# Patient Record
Sex: MALE | Race: WHITE | Employment: OTHER | URBAN - METROPOLITAN AREA
[De-identification: names, ages, dates, MRNs, and addresses within clinical notes are randomized per-mention and may not be internally consistent; named-entity substitution may affect disease eponyms.]

---

## 2017-01-10 ENCOUNTER — OFFICE VISIT (OUTPATIENT)
Dept: CARDIOLOGY CLINIC | Age: 82
End: 2017-01-10

## 2017-01-10 VITALS
WEIGHT: 149 LBS | BODY MASS INDEX: 23.95 KG/M2 | SYSTOLIC BLOOD PRESSURE: 110 MMHG | HEART RATE: 60 BPM | DIASTOLIC BLOOD PRESSURE: 60 MMHG | HEIGHT: 66 IN

## 2017-01-10 DIAGNOSIS — I25.10 CORONARY ARTERY DISEASE INVOLVING NATIVE HEART WITHOUT ANGINA PECTORIS, UNSPECIFIED VESSEL OR LESION TYPE: Primary | ICD-10-CM

## 2017-01-10 DIAGNOSIS — I65.23 CAROTID ARTERY STENOSIS WITHOUT CEREBRAL INFARCTION, BILATERAL: ICD-10-CM

## 2017-01-10 PROCEDURE — 99214 OFFICE O/P EST MOD 30 MIN: CPT | Performed by: NURSE PRACTITIONER

## 2017-01-23 RX ORDER — METOPROLOL SUCCINATE 25 MG/1
TABLET, EXTENDED RELEASE ORAL
Qty: 90 TABLET | Refills: 3 | Status: SHIPPED | OUTPATIENT
Start: 2017-01-23 | End: 2017-12-01 | Stop reason: SDUPTHER

## 2017-04-11 ENCOUNTER — TELEPHONE (OUTPATIENT)
Dept: INTERNAL MEDICINE CLINIC | Age: 82
End: 2017-04-11

## 2017-04-26 ENCOUNTER — OFFICE VISIT (OUTPATIENT)
Dept: CARDIOLOGY CLINIC | Age: 82
End: 2017-04-26

## 2017-04-26 VITALS
OXYGEN SATURATION: 94 % | DIASTOLIC BLOOD PRESSURE: 64 MMHG | BODY MASS INDEX: 24.29 KG/M2 | HEART RATE: 60 BPM | HEIGHT: 66 IN | WEIGHT: 151.12 LBS | SYSTOLIC BLOOD PRESSURE: 130 MMHG

## 2017-04-26 DIAGNOSIS — Z95.1 S/P CABG X 4: ICD-10-CM

## 2017-04-26 DIAGNOSIS — E78.49 OTHER HYPERLIPIDEMIA: ICD-10-CM

## 2017-04-26 DIAGNOSIS — I10 ESSENTIAL HYPERTENSION: ICD-10-CM

## 2017-04-26 DIAGNOSIS — I25.10 CORONARY ARTERY DISEASE INVOLVING NATIVE CORONARY ARTERY OF NATIVE HEART WITHOUT ANGINA PECTORIS: ICD-10-CM

## 2017-04-26 DIAGNOSIS — I65.23 CAROTID ARTERY STENOSIS WITHOUT CEREBRAL INFARCTION, BILATERAL: Primary | ICD-10-CM

## 2017-04-26 PROCEDURE — G8427 DOCREV CUR MEDS BY ELIG CLIN: HCPCS | Performed by: INTERNAL MEDICINE

## 2017-04-26 PROCEDURE — G8598 ASA/ANTIPLAT THER USED: HCPCS | Performed by: INTERNAL MEDICINE

## 2017-04-26 PROCEDURE — 4040F PNEUMOC VAC/ADMIN/RCVD: CPT | Performed by: INTERNAL MEDICINE

## 2017-04-26 PROCEDURE — 99213 OFFICE O/P EST LOW 20 MIN: CPT | Performed by: INTERNAL MEDICINE

## 2017-04-26 PROCEDURE — 1036F TOBACCO NON-USER: CPT | Performed by: INTERNAL MEDICINE

## 2017-04-26 PROCEDURE — G8420 CALC BMI NORM PARAMETERS: HCPCS | Performed by: INTERNAL MEDICINE

## 2017-04-26 PROCEDURE — 1123F ACP DISCUSS/DSCN MKR DOCD: CPT | Performed by: INTERNAL MEDICINE

## 2017-04-27 ENCOUNTER — OFFICE VISIT (OUTPATIENT)
Dept: INTERNAL MEDICINE CLINIC | Age: 82
End: 2017-04-27

## 2017-04-27 VITALS
HEIGHT: 66 IN | HEART RATE: 68 BPM | WEIGHT: 150 LBS | BODY MASS INDEX: 24.11 KG/M2 | DIASTOLIC BLOOD PRESSURE: 70 MMHG | SYSTOLIC BLOOD PRESSURE: 150 MMHG

## 2017-04-27 DIAGNOSIS — I10 ESSENTIAL HYPERTENSION: Chronic | ICD-10-CM

## 2017-04-27 DIAGNOSIS — M54.50 ACUTE BILATERAL LOW BACK PAIN WITHOUT SCIATICA: ICD-10-CM

## 2017-04-27 DIAGNOSIS — E78.49 OTHER HYPERLIPIDEMIA: Primary | Chronic | ICD-10-CM

## 2017-04-27 LAB
A/G RATIO: 1.5 (ref 1.1–2.2)
ALBUMIN SERPL-MCNC: 4.2 G/DL (ref 3.4–5)
ALP BLD-CCNC: 65 U/L (ref 40–129)
ALT SERPL-CCNC: 13 U/L (ref 10–40)
ANION GAP SERPL CALCULATED.3IONS-SCNC: 14 MMOL/L (ref 3–16)
AST SERPL-CCNC: 21 U/L (ref 15–37)
BASOPHILS ABSOLUTE: 0.1 K/UL (ref 0–0.2)
BASOPHILS RELATIVE PERCENT: 1.3 %
BILIRUB SERPL-MCNC: 0.3 MG/DL (ref 0–1)
BUN BLDV-MCNC: 40 MG/DL (ref 7–20)
CALCIUM SERPL-MCNC: 10.1 MG/DL (ref 8.3–10.6)
CHLORIDE BLD-SCNC: 102 MMOL/L (ref 99–110)
CHOLESTEROL, TOTAL: 273 MG/DL (ref 0–199)
CO2: 28 MMOL/L (ref 21–32)
CREAT SERPL-MCNC: 1.6 MG/DL (ref 0.8–1.3)
EOSINOPHILS ABSOLUTE: 0.3 K/UL (ref 0–0.6)
EOSINOPHILS RELATIVE PERCENT: 5.1 %
GFR AFRICAN AMERICAN: 49
GFR NON-AFRICAN AMERICAN: 41
GLOBULIN: 2.8 G/DL
GLUCOSE BLD-MCNC: 98 MG/DL (ref 70–99)
HCT VFR BLD CALC: 35.9 % (ref 40.5–52.5)
HEMOGLOBIN: 11.4 G/DL (ref 13.5–17.5)
LYMPHOCYTES ABSOLUTE: 1.2 K/UL (ref 1–5.1)
LYMPHOCYTES RELATIVE PERCENT: 19.2 %
MCH RBC QN AUTO: 26.8 PG (ref 26–34)
MCHC RBC AUTO-ENTMCNC: 31.7 G/DL (ref 31–36)
MCV RBC AUTO: 84.6 FL (ref 80–100)
MONOCYTES ABSOLUTE: 0.6 K/UL (ref 0–1.3)
MONOCYTES RELATIVE PERCENT: 10.4 %
NEUTROPHILS ABSOLUTE: 4 K/UL (ref 1.7–7.7)
NEUTROPHILS RELATIVE PERCENT: 64 %
PDW BLD-RTO: 16.7 % (ref 12.4–15.4)
PLATELET # BLD: 301 K/UL (ref 135–450)
PMV BLD AUTO: 8.3 FL (ref 5–10.5)
POTASSIUM SERPL-SCNC: 4.8 MMOL/L (ref 3.5–5.1)
RBC # BLD: 4.24 M/UL (ref 4.2–5.9)
SODIUM BLD-SCNC: 144 MMOL/L (ref 136–145)
TOTAL PROTEIN: 7 G/DL (ref 6.4–8.2)
WBC # BLD: 6.2 K/UL (ref 4–11)

## 2017-04-27 PROCEDURE — 1036F TOBACCO NON-USER: CPT | Performed by: INTERNAL MEDICINE

## 2017-04-27 PROCEDURE — G8428 CUR MEDS NOT DOCUMENT: HCPCS | Performed by: INTERNAL MEDICINE

## 2017-04-27 PROCEDURE — G8420 CALC BMI NORM PARAMETERS: HCPCS | Performed by: INTERNAL MEDICINE

## 2017-04-27 PROCEDURE — 1123F ACP DISCUSS/DSCN MKR DOCD: CPT | Performed by: INTERNAL MEDICINE

## 2017-04-27 PROCEDURE — 4040F PNEUMOC VAC/ADMIN/RCVD: CPT | Performed by: INTERNAL MEDICINE

## 2017-04-27 PROCEDURE — 99214 OFFICE O/P EST MOD 30 MIN: CPT | Performed by: INTERNAL MEDICINE

## 2017-04-27 PROCEDURE — G8598 ASA/ANTIPLAT THER USED: HCPCS | Performed by: INTERNAL MEDICINE

## 2017-05-12 ENCOUNTER — HOSPITAL ENCOUNTER (OUTPATIENT)
Dept: VASCULAR LAB | Age: 82
Discharge: HOME | End: 2017-05-12
Attending: NURSE PRACTITIONER | Admitting: NURSE PRACTITIONER

## 2017-05-12 DIAGNOSIS — I65.23 CAROTID ARTERY STENOSIS WITHOUT CEREBRAL INFARCTION, BILATERAL: Primary | Chronic | ICD-10-CM

## 2017-05-12 DIAGNOSIS — I65.23 OCCLUSION AND STENOSIS OF BILATERAL CAROTID ARTERIES: ICD-10-CM

## 2017-05-15 ENCOUNTER — TELEPHONE (OUTPATIENT)
Dept: CARDIOLOGY CLINIC | Age: 82
End: 2017-05-15

## 2017-05-15 DIAGNOSIS — I65.23 CAROTID ARTERY STENOSIS WITHOUT CEREBRAL INFARCTION, BILATERAL: Primary | Chronic | ICD-10-CM

## 2017-05-25 RX ORDER — AMLODIPINE BESYLATE 5 MG/1
TABLET ORAL
Qty: 90 TABLET | Refills: 2 | OUTPATIENT
Start: 2017-05-25

## 2017-08-31 ENCOUNTER — OFFICE VISIT (OUTPATIENT)
Dept: INTERNAL MEDICINE CLINIC | Age: 82
End: 2017-08-31

## 2017-08-31 VITALS
SYSTOLIC BLOOD PRESSURE: 124 MMHG | HEART RATE: 72 BPM | HEIGHT: 66 IN | WEIGHT: 154 LBS | DIASTOLIC BLOOD PRESSURE: 62 MMHG | BODY MASS INDEX: 24.75 KG/M2

## 2017-08-31 DIAGNOSIS — K21.9 GASTROESOPHAGEAL REFLUX DISEASE, ESOPHAGITIS PRESENCE NOT SPECIFIED: ICD-10-CM

## 2017-08-31 DIAGNOSIS — I25.118 CORONARY ARTERY DISEASE OF NATIVE ARTERY OF NATIVE HEART WITH STABLE ANGINA PECTORIS (HCC): Primary | ICD-10-CM

## 2017-08-31 PROCEDURE — G8420 CALC BMI NORM PARAMETERS: HCPCS | Performed by: INTERNAL MEDICINE

## 2017-08-31 PROCEDURE — 99214 OFFICE O/P EST MOD 30 MIN: CPT | Performed by: INTERNAL MEDICINE

## 2017-08-31 PROCEDURE — 1123F ACP DISCUSS/DSCN MKR DOCD: CPT | Performed by: INTERNAL MEDICINE

## 2017-08-31 PROCEDURE — 1036F TOBACCO NON-USER: CPT | Performed by: INTERNAL MEDICINE

## 2017-08-31 PROCEDURE — G8598 ASA/ANTIPLAT THER USED: HCPCS | Performed by: INTERNAL MEDICINE

## 2017-08-31 PROCEDURE — G8427 DOCREV CUR MEDS BY ELIG CLIN: HCPCS | Performed by: INTERNAL MEDICINE

## 2017-08-31 PROCEDURE — 4040F PNEUMOC VAC/ADMIN/RCVD: CPT | Performed by: INTERNAL MEDICINE

## 2017-08-31 RX ORDER — NITROGLYCERIN 0.4 MG/1
0.4 TABLET SUBLINGUAL EVERY 5 MIN PRN
Qty: 25 TABLET | Refills: 3 | Status: SHIPPED | OUTPATIENT
Start: 2017-08-31

## 2017-09-01 ENCOUNTER — TELEPHONE (OUTPATIENT)
Dept: CARDIOLOGY CLINIC | Age: 82
End: 2017-09-01

## 2017-09-05 ENCOUNTER — OFFICE VISIT (OUTPATIENT)
Dept: CARDIOLOGY CLINIC | Age: 82
End: 2017-09-05

## 2017-09-05 VITALS
HEIGHT: 66 IN | DIASTOLIC BLOOD PRESSURE: 80 MMHG | HEART RATE: 54 BPM | BODY MASS INDEX: 24.7 KG/M2 | WEIGHT: 153.7 LBS | SYSTOLIC BLOOD PRESSURE: 172 MMHG

## 2017-09-05 DIAGNOSIS — I10 ESSENTIAL HYPERTENSION: Chronic | ICD-10-CM

## 2017-09-05 DIAGNOSIS — R07.9 CHEST PAIN, UNSPECIFIED TYPE: Primary | ICD-10-CM

## 2017-09-05 DIAGNOSIS — I25.118 CORONARY ARTERY DISEASE OF NATIVE ARTERY OF NATIVE HEART WITH STABLE ANGINA PECTORIS (HCC): ICD-10-CM

## 2017-09-05 DIAGNOSIS — Z95.1 S/P CABG X 4: Chronic | ICD-10-CM

## 2017-09-05 PROCEDURE — G8598 ASA/ANTIPLAT THER USED: HCPCS | Performed by: NURSE PRACTITIONER

## 2017-09-05 PROCEDURE — 1036F TOBACCO NON-USER: CPT | Performed by: NURSE PRACTITIONER

## 2017-09-05 PROCEDURE — 93000 ELECTROCARDIOGRAM COMPLETE: CPT | Performed by: NURSE PRACTITIONER

## 2017-09-05 PROCEDURE — G8420 CALC BMI NORM PARAMETERS: HCPCS | Performed by: NURSE PRACTITIONER

## 2017-09-05 PROCEDURE — 4040F PNEUMOC VAC/ADMIN/RCVD: CPT | Performed by: NURSE PRACTITIONER

## 2017-09-05 PROCEDURE — 99214 OFFICE O/P EST MOD 30 MIN: CPT | Performed by: NURSE PRACTITIONER

## 2017-09-05 PROCEDURE — 1123F ACP DISCUSS/DSCN MKR DOCD: CPT | Performed by: NURSE PRACTITIONER

## 2017-09-05 PROCEDURE — G8427 DOCREV CUR MEDS BY ELIG CLIN: HCPCS | Performed by: NURSE PRACTITIONER

## 2017-09-08 ENCOUNTER — HOSPITAL ENCOUNTER (OUTPATIENT)
Dept: GENERAL RADIOLOGY | Age: 82
Discharge: HOME | End: 2017-09-08
Admitting: INTERNAL MEDICINE

## 2017-09-08 DIAGNOSIS — I20.0 UNSTABLE ANGINA (HCC): ICD-10-CM

## 2017-09-18 ENCOUNTER — CARE COORDINATION (OUTPATIENT)
Dept: CASE MANAGEMENT | Age: 82
End: 2017-09-18

## 2017-09-18 ENCOUNTER — TELEPHONE (OUTPATIENT)
Dept: PHARMACY | Facility: CLINIC | Age: 82
End: 2017-09-18

## 2017-09-20 ENCOUNTER — TELEPHONE (OUTPATIENT)
Dept: CARDIOLOGY CLINIC | Age: 82
End: 2017-09-20

## 2017-09-22 ENCOUNTER — TELEPHONE (OUTPATIENT)
Dept: CARDIOLOGY CLINIC | Age: 82
End: 2017-09-22

## 2017-09-29 ENCOUNTER — TELEPHONE (OUTPATIENT)
Dept: PHARMACY | Facility: CLINIC | Age: 82
End: 2017-09-29

## 2017-09-29 ENCOUNTER — CARE COORDINATION (OUTPATIENT)
Dept: CASE MANAGEMENT | Age: 82
End: 2017-09-29

## 2017-10-03 ENCOUNTER — TELEPHONE (OUTPATIENT)
Dept: CARDIOLOGY CLINIC | Age: 82
End: 2017-10-03

## 2017-10-03 ENCOUNTER — OFFICE VISIT (OUTPATIENT)
Dept: CARDIOLOGY CLINIC | Age: 82
End: 2017-10-03

## 2017-10-03 VITALS
HEIGHT: 66 IN | SYSTOLIC BLOOD PRESSURE: 102 MMHG | HEART RATE: 62 BPM | BODY MASS INDEX: 24.43 KG/M2 | WEIGHT: 152 LBS | DIASTOLIC BLOOD PRESSURE: 50 MMHG

## 2017-10-03 DIAGNOSIS — N17.9 ACUTE RENAL FAILURE, UNSPECIFIED ACUTE RENAL FAILURE TYPE (HCC): ICD-10-CM

## 2017-10-03 DIAGNOSIS — I25.10 CORONARY ARTERY DISEASE INVOLVING NATIVE HEART WITHOUT ANGINA PECTORIS, UNSPECIFIED VESSEL OR LESION TYPE: ICD-10-CM

## 2017-10-03 DIAGNOSIS — I10 ESSENTIAL HYPERTENSION: Primary | ICD-10-CM

## 2017-10-03 PROCEDURE — 1123F ACP DISCUSS/DSCN MKR DOCD: CPT | Performed by: NURSE PRACTITIONER

## 2017-10-03 PROCEDURE — 4040F PNEUMOC VAC/ADMIN/RCVD: CPT | Performed by: NURSE PRACTITIONER

## 2017-10-03 PROCEDURE — G8427 DOCREV CUR MEDS BY ELIG CLIN: HCPCS | Performed by: NURSE PRACTITIONER

## 2017-10-03 PROCEDURE — 1036F TOBACCO NON-USER: CPT | Performed by: NURSE PRACTITIONER

## 2017-10-03 PROCEDURE — G8598 ASA/ANTIPLAT THER USED: HCPCS | Performed by: NURSE PRACTITIONER

## 2017-10-03 PROCEDURE — 99214 OFFICE O/P EST MOD 30 MIN: CPT | Performed by: NURSE PRACTITIONER

## 2017-10-03 PROCEDURE — 1111F DSCHRG MED/CURRENT MED MERGE: CPT | Performed by: NURSE PRACTITIONER

## 2017-10-03 PROCEDURE — G8420 CALC BMI NORM PARAMETERS: HCPCS | Performed by: NURSE PRACTITIONER

## 2017-10-03 PROCEDURE — G8484 FLU IMMUNIZE NO ADMIN: HCPCS | Performed by: NURSE PRACTITIONER

## 2017-10-03 RX ORDER — FUROSEMIDE 20 MG/1
20 TABLET ORAL DAILY
Qty: 30 TABLET | Refills: 3 | Status: SHIPPED | OUTPATIENT
Start: 2017-10-03 | End: 2018-04-16 | Stop reason: SDUPTHER

## 2017-10-03 NOTE — LETTER
[address] [address] [address] [address]  Phone: [phone]  Fax: [phone]    Carla Judd NP        October 3, 2017     Gonsalo Bean, [address] [place] [address]    Patient: Kee Holden  MR Number: [id number]  YOB: 1926  Date of Visit: 10/3/2017    Dear Dr. Gonsalo Bean:          Cardiac Follow Up    Referring Provider:  Gonsalo Bean MD        Chief Complaint   Patient presents with    Coronary Artery Disease     S/p pci    Hypertension    Hyperlipidemia       History of Present Illness:  Mr. 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 He was admitted for cath and found to be. He was found that he had a  patent LIMA graft to his left anterior descending. His vein grafts  had degenerated with his right coronary artery vein graft having  serial 50% stenosis and his vein graft to diagonal and obtuse marginal  and sequence having a 95% eccentric stenosis with possible thrombus. At that time, he had creatinine of 1.6. He was given fluids. At that  time, it was decided to place the patient on dual antiplatelet therapy  with Plavix and aspirin and then readmit at a later date for  intervention on the vein grafts. During that week, he had a barium  swallow for food getting stuck. It was found that he had a risk of  aspiration and a mild dysmotility disorderHe has known ischemic cardiomyopathy with an ejection fraction in range of 40-45%. He also had  echocardiography, which showed moderate mitral regurgitation, very  mild aortic stenosis, and mild-to-moderate aortic insufficiency with a  normal left ventricular ejection fraction. He also has mild AI. He had his left CEA on 1/11/16. He was evaluated by nephrology and given hydration.   He was then brought back and had  LVEDP 15-18 appearing restricted in movement. Very  mild aortic stenosis. Mild to moderate aortic and mitral regurgitation. Mild tricuspid regurgitation with RVSP estimated at 28 mmHg. 9/17Left ventricle size is normal.   -Normal left ventricular wall thickness.   -Global left ventricular function is decreased with ejection fraction   estimated from 35 % to 40 %.  -There is hypokinesis of the apical anteroseptal wall. E/e'= 16.2 .   -Mitral annular calcification is present.   -Calcification of the mitral valve noted.   -Moderate mitral regurgitation is present.   -The aortic valve is moderately calcified with upper limits gradients.   -Moderate aortic regurgitation is present.   -There is moderate tricuspid regurgitation with RVSP estimated at 56 mmHg. Problem-- Carotid disease    6/8504: ADALID 41-48% LICA 71-35%  53/3010: ADALID 70-95% LICA 43-92%, unchanged  Left CEA 1/11/16, followed by Dr. Raine Mancera      Problem:HTN- improved    PLAN: d/w DR. Kentrell Clarke  Restart lasix 20 mg a day  Labs three weeks    If you have questions, please do not hesitate to call me. I look forward to following Rhonda Rodriguez along with you.     Sincerely,        Anthony Ortega NP

## 2017-10-03 NOTE — COMMUNICATION BODY
(coronary artery disease); Cancer [facility]); CHF (congestive heart failure) ([facility] [address] GERD (gastroesophageal reflux disease); History of blood transfusion; History of MI (myocardial infarction); Hyperlipidemia; and Hypertension. Surgical History:   has a past surgical history that includes Coronary angioplasty with stent (); joint replacement; Cardiac surgery (); Knee Arthroplasty (Left, 2014); and Carotid endarterectomy (Left, 16). Social History:   reports that he quit smoking about 57 years ago. He has never used smokeless tobacco. He reports that he does not drink alcohol or use illicit drugs. He denies tobacco use misuse of prescription medications or illicit drug use. Family History: Mother--  Father--    Home Medications:     Outpatient Encounter Prescriptions as of 10/3/2017   Medication Sig Dispense Refill    furosemide (LASIX) 20 MG tablet Take 1 tablet by mouth daily 30 tablet 3    isosorbide mononitrate (IMDUR) 30 MG extended release tablet Take 1 tablet by mouth daily 30 tablet 3    clopidogrel (PLAVIX) 75 MG tablet Take 1 tablet by mouth daily 30 tablet 3    nitroGLYCERIN (NITROSTAT) 0.4 MG SL tablet Place 1 tablet under the tongue every 5 minutes as needed for Chest pain up to max of 3 total doses. If no relief after 1 dose, call 911. 25 tablet 3    metoprolol succinate (TOPROL XL) 25 MG extended release tablet TAKE 1 TABLET DAILY IN THE MORNING 90 tablet 3    aspirin 81 MG tablet Take 81 mg by mouth daily.  calcium carbonate (CALCIUM 500) 1250 MG tablet Take 1 tablet by mouth daily.  fish oil-omega-3 fatty acids 1000 MG capsule Take 2 g by mouth daily. No facility-administered encounter medications on file as of 10/3/2017. Allergies:  Statins and Ambien [zolpidem tartrate]     Review of Systems:   · Constitutional: there has been no unanticipated weight loss.  There's been no change in energy level, sleep pattern, or activity level.     · Eyes: No visual changes or diplopia. No scleral icterus. · ENT: No Headaches, hearing loss or vertigo. No mouth sores or sore throat. · Cardiovascular: Reviewed in HPI  · Respiratory: No cough or wheezing, no sputum production. No hematemesis. · Gastrointestinal: No abdominal pain, appetite loss, blood in stools. No change in bowel or bladder habits. · Genitourinary: No dysuria, trouble voiding, or hematuria. · Musculoskeletal:  No gait disturbance, weakness or joint complaints. · Integumentary: No rash or pruritis. · Neurological: No headache, diplopia, change in muscle strength, numbness or tingling. No change in gait, balance, coordination, mood, affect, memory, mentation, behavior. · Psychiatric: No anxiety, no depression. · Endocrine: No malaise, fatigue or temperature intolerance. No excessive thirst, fluid intake, or urination. No tremor. · Hematologic/Lymphatic: No abnormal bruising or bleeding, blood clots or swollen lymph nodes. · Allergic/Immunologic: No nasal congestion or hives. Physical Examination:    Vitals:    10/03/17 1050   BP: (!) 102/50   Pulse: 62        Constitutional and General Appearance: NAD, vigorous and appears younger than stated age   Respiratory:  · Normal excursion and expansion without use of accessory muscles  · Resp Auscultation: Normal breath sounds without dullness  Cardiovascular:  · The apical impulses not displaced  · Heart tones are crisp and normal  · Cervical veins are not engorged  · The carotid upstroke is normal in amplitude and contour without delay, + bruits  · Left neck incision  CEA scar  · Regular rate and rhythm. · No adventitious sounds. · Well-healed median sternotomy incision  · Peripheral pulses are symmetrical and full  · There is no clubbing, cyanosis of the extremities.   · No edema  · Femoral Arteries: 2+ and equal  · Pedal Pulses: 2+ and equal   Abdomen:  · No masses or tenderness  · Liver/Spleen: No Abnormalities Noted  Neurological/Psychiatric:  · Alert and oriented in all spheres  · Moves all extremities well  · Exhibits normal gait balance and coordination, cath site stable  · No abnormalities of mood, affect, memory, mentation, or behavior are noted      Assessment/Plan:    Problem- coronary disease:  Hx of CABG in 1999 and prior stent to LCx.  5/2009: SVG to right PDA patent, SVG to diagonal 1 and ramus patent, SVG to diagonal 1 and ramus patent--LIMA to LAD (small vessel) patent. Last angiogram in 7/2011 showed patent LCx stent, and patent grafts. EF 60%. 9/17LVEDP 15-18  SVG to diag and ramus with 95% mid body stenosis with previous thrombus seen much improved. Stented with 3 x 18 Alpine with excellent angiographic result but slow flow into diag that improved over time. Suspect he gets collateral to diag from LIMA to LAD  [facility] feels better      Problem-- hypertension  BP (!) 102/50  Pulse 62  Ht 5' 6\" (1.676 m)  Wt 152 lb (68.9 kg)  BMI 24.53 kg/m2    Problem-- hyperlipidemia  A target LDL goal of 70 was reinforced. . Managed by PCP. Problem--AI  4/2013 echo: Normal left ventricle size, wall thickness and Low normal systolic function with an estimated ejection fraction of 55%. No regional wall motion abnormalities are seen. Abnormal septal  motion. Rebekah Gonzalez E:A reversal c/w impaired left ventricular relaxation. Mild calcification of the mitral valve. The aortic valve appears sclerotic with the right coronary cusp appearing restricted in movement. Very  mild aortic stenosis. Mild to moderate aortic and mitral regurgitation. Mild tricuspid regurgitation with RVSP estimated at 28 mmHg. 9/17Left ventricle size is normal.   -Normal left ventricular wall thickness.   -Global left ventricular function is decreased with ejection fraction   estimated from 35 % to 40 %.  -There is hypokinesis of the apical anteroseptal wall.  E/e'= 16.2 .   -Mitral annular calcification is present.   -Calcification of the mitral valve

## 2017-10-03 NOTE — MR AVS SNAPSHOT
StepUp allows you to send messages to your doctor, view your test results, renew your prescriptions, schedule appointments, view visit notes, and more. How Do I Sign Up? 1. In your Internet browser, go to [email or web address]  2. Click on the Sign Up Now link in the Sign In box. You will see the New Member Sign Up page. 3. Enter your StepUp Access Code exactly as it appears below. You will not need to use this code after youve completed the sign-up process. If you do not sign up before the expiration date, you must request a new code. StepUp Access Code: [email or web address]  Expires: 10/30/2017  1:20 PM    4. Enter your Social Security Number ([id number]) and Date of Birth (mm/dd/yyyy) as indicated and click Submit. You will be taken to the next sign-up page. 5. Create a StepUp ID. This will be your StepUp login ID and cannot be changed, so think of one that is secure and easy to remember. 6. Create a StepUp password. You can change your password at any time. 7. Enter your Password Reset Question and Answer. This can be used at a later time if you forget your password. 8. Enter your e-mail address. You will receive e-mail notification when new information is available in [address]. 9. Click Sign Up. You can now view your medical record. Additional Information  If you have questions, please contact the physician practice where you receive care. Remember, StepUp is NOT to be used for urgent needs. For medical emergencies, dial 911. For questions regarding your StepUp account call [phone]. If you have a clinical question, please call your doctor's office.

## 2017-10-03 NOTE — TELEPHONE ENCOUNTER
Called pt CR spoke with Keith Seat he is to take lasix 20 mg daily and check bmp in 2 weeks. Pt verbalized understanding.

## 2017-10-03 NOTE — PROGRESS NOTES
Cardiac Follow Up    Referring Provider:  Isabel Calderon MD        Chief Complaint   Patient presents with    Coronary Artery Disease     S/p pci    Hypertension    Hyperlipidemia       History of Present Illness:  Mr. 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 He was admitted for cath and found to be. He was found that he had a  patent LIMA graft to his left anterior descending. His vein grafts  had degenerated with his right coronary artery vein graft having  serial 50% stenosis and his vein graft to diagonal and obtuse marginal  and sequence having a 95% eccentric stenosis with possible thrombus. At that time, he had creatinine of 1.6. He was given fluids. At that  time, it was decided to place the patient on dual antiplatelet therapy  with Plavix and aspirin and then readmit at a later date for  intervention on the vein grafts. During that week, he had a barium  swallow for food getting stuck. It was found that he had a risk of  aspiration and a mild dysmotility disorderHe has known ischemic cardiomyopathy with an ejection fraction in range of 40-45%. He also had  echocardiography, which showed moderate mitral regurgitation, very  mild aortic stenosis, and mild-to-moderate aortic insufficiency with a  normal left ventricular ejection fraction. He also has mild AI. He had his left CEA on 1/11/16. He was evaluated by nephrology and given hydration. He was then brought back and had  LVEDP 15-18  SVG to diag and ramus with 95% mid body stenosis with previous thrombus seen much improved. Stented with 3 x 18 Alpine with excellent angiographic result but slow flow into diag that improved over time. Suspect he gets collateral to [facility] from [facility]A to [facility] denies edema, palpitations. SInce home he has felt better and denies chest pain or SOB.       Past Medical History:   has a past medical history of Arthritis; Basal cell carcinoma; CAD (coronary artery disease); Cancer [facility]); CHF (congestive heart failure) ([facility] [address] GERD (gastroesophageal reflux disease); History of blood transfusion; History of MI (myocardial infarction); Hyperlipidemia; and Hypertension. Surgical History:   has a past surgical history that includes Coronary angioplasty with stent (); joint replacement; Cardiac surgery (); Knee Arthroplasty (Left, 2014); and Carotid endarterectomy (Left, 16). Social History:   reports that he quit smoking about 57 years ago. He has never used smokeless tobacco. He reports that he does not drink alcohol or use illicit drugs. He denies tobacco use misuse of prescription medications or illicit drug use. Family History: Mother--  Father--    Home Medications:     Outpatient Encounter Prescriptions as of 10/3/2017   Medication Sig Dispense Refill    furosemide (LASIX) 20 MG tablet Take 1 tablet by mouth daily 30 tablet 3    isosorbide mononitrate (IMDUR) 30 MG extended release tablet Take 1 tablet by mouth daily 30 tablet 3    clopidogrel (PLAVIX) 75 MG tablet Take 1 tablet by mouth daily 30 tablet 3    nitroGLYCERIN (NITROSTAT) 0.4 MG SL tablet Place 1 tablet under the tongue every 5 minutes as needed for Chest pain up to max of 3 total doses. If no relief after 1 dose, call 911. 25 tablet 3    metoprolol succinate (TOPROL XL) 25 MG extended release tablet TAKE 1 TABLET DAILY IN THE MORNING 90 tablet 3    aspirin 81 MG tablet Take 81 mg by mouth daily.  calcium carbonate (CALCIUM 500) 1250 MG tablet Take 1 tablet by mouth daily.  fish oil-omega-3 fatty acids 1000 MG capsule Take 2 g by mouth daily. No facility-administered encounter medications on file as of 10/3/2017. Allergies:  Statins and Ambien [zolpidem tartrate]     Review of Systems:   · Constitutional: there has been no unanticipated weight loss.  There's been no change in energy level, sleep pattern, or activity level.     · Eyes: No visual changes or diplopia. No scleral icterus. · ENT: No Headaches, hearing loss or vertigo. No mouth sores or sore throat. · Cardiovascular: Reviewed in HPI  · Respiratory: No cough or wheezing, no sputum production. No hematemesis. · Gastrointestinal: No abdominal pain, appetite loss, blood in stools. No change in bowel or bladder habits. · Genitourinary: No dysuria, trouble voiding, or hematuria. · Musculoskeletal:  No gait disturbance, weakness or joint complaints. · Integumentary: No rash or pruritis. · Neurological: No headache, diplopia, change in muscle strength, numbness or tingling. No change in gait, balance, coordination, mood, affect, memory, mentation, behavior. · Psychiatric: No anxiety, no depression. · Endocrine: No malaise, fatigue or temperature intolerance. No excessive thirst, fluid intake, or urination. No tremor. · Hematologic/Lymphatic: No abnormal bruising or bleeding, blood clots or swollen lymph nodes. · Allergic/Immunologic: No nasal congestion or hives. Physical Examination:    Vitals:    10/03/17 1050   BP: (!) 102/50   Pulse: 62        Constitutional and General Appearance: NAD, vigorous and appears younger than stated age   Respiratory:  · Normal excursion and expansion without use of accessory muscles  · Resp Auscultation: Normal breath sounds without dullness  Cardiovascular:  · The apical impulses not displaced  · Heart tones are crisp and normal  · Cervical veins are not engorged  · The carotid upstroke is normal in amplitude and contour without delay, + bruits  · Left neck incision  CEA scar  · Regular rate and rhythm. · No adventitious sounds. · Well-healed median sternotomy incision  · Peripheral pulses are symmetrical and full  · There is no clubbing, cyanosis of the extremities.   · No edema  · Femoral Arteries: 2+ and equal  · Pedal Pulses: 2+ and equal   Abdomen:  · No masses or tenderness  · Liver/Spleen: No Abnormalities noted.   -Moderate mitral regurgitation is present.   -The aortic valve is moderately calcified with upper limits gradients.   -Moderate aortic regurgitation is present.   -There is moderate tricuspid regurgitation with RVSP estimated at 56 mmHg.       Problem-- Carotid disease    6/0632: ADALID 88-71% LICA 61-22%  74/2641: ADALID 47-06% LICA 91-63%, unchanged  Left CEA 1/11/16, followed by Dr. Aleks Sanz      Problem:HTN- improved    PLAN: d/w DR. Christina Davison  Restart lasix 20 mg a day  Labs three weeks

## 2017-10-04 ENCOUNTER — CARE COORDINATION (OUTPATIENT)
Dept: CASE MANAGEMENT | Age: 82
End: 2017-10-04

## 2017-10-10 ENCOUNTER — CARE COORDINATION (OUTPATIENT)
Dept: CASE MANAGEMENT | Age: 82
End: 2017-10-10

## 2017-10-10 NOTE — CARE COORDINATION
[facility] Transitions Follow Up Call    10/10/2017    Patient: Sukhjinder Elder  Patient : 1926   MRN: [id number]  Reason for Admission: CAD;CABGx4  Discharge Date: 17 RARS: Risk Score: 23.5       Follow up call attempted, left contact info on       Follow Up  Future Appointments  Date Time Provider Ben Dawkins   10/27/2017 1:30 PM Gabriela Farrell NP [facility]   12/15/2017 11:25 AM Gabriela Farrell NP  [facility]   2018 1:00 PM Ortiz Cary MD [facility]   2018 1:00 PM Rhina Devries MD  Cardio [facility]       Amirah Sanders RN

## 2017-10-11 ENCOUNTER — CARE COORDINATION (OUTPATIENT)
Dept: CASE MANAGEMENT | Age: 82
End: 2017-10-11

## 2017-10-12 NOTE — CARE COORDINATION
[facility] Transitions Follow Up Call    10/12/2017    Patient: Justin Pretty  Patient : 1926   MRN: [id number]  Reason for Admission: CAD;CABGx4  Discharge Date: 17 RARS: Risk Score: 23.5       Spoke with: Arben Jones 55 Transitions Subsequent and Final Call    Subsequent and Final Calls  Do you have any ongoing symptoms?:  No  Have your medications changed?:  No  Do you have any questions related to your medications?:  No  Do you currently have any active services?:  No  Do you have any needs or concerns that I can assist you with?:  No  Identified Barriers:  None  Care Transitions Interventions  No Identified Needs  Other Interventions:          Pt states doing well, no issues or concerns.  No Need for further CTC f/u calls      Follow Up  Future Appointments  Date Time Provider Ben Dawkins   10/27/2017 1:30 PM Samson Pinto NP  Card[facility]   12/15/2017 11:25 AM Samson Pinto NP [facility]   2018 1:00 PM Agnes Lang MD [facility]   2018 1:00 PM Ivory Arias MD  Cardio [facility]       Megan Jerry RN

## 2017-10-27 ENCOUNTER — OFFICE VISIT (OUTPATIENT)
Dept: CARDIOLOGY CLINIC | Age: 82
End: 2017-10-27

## 2017-10-27 ENCOUNTER — HOSPITAL ENCOUNTER (OUTPATIENT)
Dept: OTHER | Age: 82
Discharge: HOME | End: 2017-10-27
Attending: NURSE PRACTITIONER | Admitting: NURSE PRACTITIONER

## 2017-10-27 VITALS
DIASTOLIC BLOOD PRESSURE: 58 MMHG | WEIGHT: 150 LBS | SYSTOLIC BLOOD PRESSURE: 142 MMHG | HEIGHT: 66 IN | BODY MASS INDEX: 24.11 KG/M2 | HEART RATE: 62 BPM

## 2017-10-27 DIAGNOSIS — Z95.1 S/P CABG X 4: Chronic | ICD-10-CM

## 2017-10-27 DIAGNOSIS — R06.02 SOB (SHORTNESS OF BREATH): Primary | ICD-10-CM

## 2017-10-27 DIAGNOSIS — I25.118 CORONARY ARTERY DISEASE OF NATIVE ARTERY OF NATIVE HEART WITH STABLE ANGINA PECTORIS (HCC): ICD-10-CM

## 2017-10-27 DIAGNOSIS — I10 ESSENTIAL HYPERTENSION: Chronic | ICD-10-CM

## 2017-10-27 DIAGNOSIS — R06.02 SOB (SHORTNESS OF BREATH): ICD-10-CM

## 2017-10-27 DIAGNOSIS — E78.49 OTHER HYPERLIPIDEMIA: Chronic | ICD-10-CM

## 2017-10-27 LAB
ANION GAP SERPL CALCULATED.3IONS-SCNC: 15 MMOL/L (ref 3–16)
BUN BLDV-MCNC: 44 MG/DL (ref 7–20)
CALCIUM SERPL-MCNC: 10.8 MG/DL (ref 8.3–10.6)
CHLORIDE BLD-SCNC: 102 MMOL/L (ref 99–110)
CO2: 27 MMOL/L (ref 21–32)
CREAT SERPL-MCNC: 1.8 MG/DL (ref 0.8–1.3)
GFR AFRICAN AMERICAN: 43
GFR NON-AFRICAN AMERICAN: 35
GLUCOSE BLD-MCNC: 94 MG/DL (ref 70–99)
POTASSIUM SERPL-SCNC: 4.7 MMOL/L (ref 3.5–5.1)
PRO-BNP: 5252 PG/ML (ref 0–449)
SODIUM BLD-SCNC: 144 MMOL/L (ref 136–145)

## 2017-10-27 PROCEDURE — G8484 FLU IMMUNIZE NO ADMIN: HCPCS | Performed by: NURSE PRACTITIONER

## 2017-10-27 PROCEDURE — G8598 ASA/ANTIPLAT THER USED: HCPCS | Performed by: NURSE PRACTITIONER

## 2017-10-27 PROCEDURE — G8420 CALC BMI NORM PARAMETERS: HCPCS | Performed by: NURSE PRACTITIONER

## 2017-10-27 PROCEDURE — 4040F PNEUMOC VAC/ADMIN/RCVD: CPT | Performed by: NURSE PRACTITIONER

## 2017-10-27 PROCEDURE — 99214 OFFICE O/P EST MOD 30 MIN: CPT | Performed by: NURSE PRACTITIONER

## 2017-10-27 PROCEDURE — 1111F DSCHRG MED/CURRENT MED MERGE: CPT | Performed by: NURSE PRACTITIONER

## 2017-10-27 PROCEDURE — 1036F TOBACCO NON-USER: CPT | Performed by: NURSE PRACTITIONER

## 2017-10-27 PROCEDURE — G8427 DOCREV CUR MEDS BY ELIG CLIN: HCPCS | Performed by: NURSE PRACTITIONER

## 2017-10-27 PROCEDURE — 1123F ACP DISCUSS/DSCN MKR DOCD: CPT | Performed by: NURSE PRACTITIONER

## 2017-10-27 NOTE — LETTER
[address] [address] Kisha [address] [address]  Phone: [phone]  Fax: [phone]    Latrice Richardson NP        October 27, 2017     Michel Cain, [address] [place] [address]    Patient: Stevo Chacon  MR Number: [id number]  YOB: 1926  Date of Visit: 10/27/2017    Dear Dr. Michel Cain:              Cardiac Follow Up    Referring Provider:  Michel Cain MD        Chief Complaint   Patient presents with    Coronary Artery Disease    Hypertension    Hyperlipidemia       History of Present Illness:  Mr. 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 He had cath  patent LIMA graft to his left anterior descending. His vein grafts had degenerated with his right coronary artery vein graft having serial 50% stenosis and his vein graft to diagonal and obtuse marginal  and sequence having a 95% eccentric stenosis with possible thrombus. He has known ischemic cardiomyopathy with an ejection fraction in range of 40-45%. He also had  echocardiography, which showed moderate mitral regurgitation, very mild aortic stenosis, and mild-to-moderate aortic insufficiency with a  normal left ventricular ejection fraction. He also has mild AI. He had his left CEA on 1/11/16. Past Medical History:   has a past medical history of Arthritis; Basal cell carcinoma; CAD (coronary artery disease); Cancer [facility]); CHF (congestive heart failure) ([facility] [address] GERD (gastroesophageal reflux disease); History of blood transfusion; History of MI (myocardial infarction); Hyperlipidemia; and Hypertension. Surgical History:   has a past surgical history that includes Coronary angioplasty with stent (2007); joint replacement; Cardiac surgery (1999); Knee Arthroplasty (Left, 09/26/2014); and Carotid endarterectomy (Left, 1/11/16).      Social History: reports that he quit smoking about 57 years ago. He has never used smokeless tobacco. He reports that he does not drink alcohol or use drugs. He denies tobacco use misuse of prescription medications or illicit drug use. Family History: Mother--  Father--    Home Medications:     Outpatient Encounter Prescriptions as of 10/27/2017   Medication Sig Dispense Refill    furosemide (LASIX) 20 MG tablet Take 1 tablet by mouth daily 30 tablet 3    isosorbide mononitrate (IMDUR) 30 MG extended release tablet Take 1 tablet by mouth daily 30 tablet 3    clopidogrel (PLAVIX) 75 MG tablet Take 1 tablet by mouth daily 30 tablet 3    nitroGLYCERIN (NITROSTAT) 0.4 MG SL tablet Place 1 tablet under the tongue every 5 minutes as needed for Chest pain up to max of 3 total doses. If no relief after 1 dose, call 911. 25 tablet 3    metoprolol succinate (TOPROL XL) 25 MG extended release tablet TAKE 1 TABLET DAILY IN THE MORNING 90 tablet 3    aspirin 81 MG tablet Take 81 mg by mouth daily.  calcium carbonate (CALCIUM 500) 1250 MG tablet Take 1 tablet by mouth daily.  fish oil-omega-3 fatty acids 1000 MG capsule Take 2 g by mouth daily. No facility-administered encounter medications on file as of 10/27/2017. Allergies:  Statins and Ambien [zolpidem tartrate]     Review of Systems:   · Constitutional: there has been no unanticipated weight loss. There's been no change in energy level, sleep pattern, or activity level. · Eyes: No visual changes or diplopia. No scleral icterus. · ENT: No Headaches, hearing loss or vertigo. No mouth sores or sore throat. · Cardiovascular: Reviewed in HPI  · Respiratory: No cough or wheezing, no sputum production. No hematemesis. · Gastrointestinal: No abdominal pain, appetite loss, blood in stools. No change in bowel or bladder habits. · Genitourinary: No dysuria, trouble voiding, or hematuria. · Musculoskeletal:  No gait disturbance, weakness or joint complaints. · Integumentary: No rash or pruritis. · Neurological: No headache, diplopia, change in muscle strength, numbness or tingling. No change in gait, balance, coordination, mood, affect, memory, mentation, behavior. · Psychiatric: No anxiety, no depression. · Endocrine: No malaise, fatigue or temperature intolerance. No excessive thirst, fluid intake, or urination. No tremor. · Hematologic/Lymphatic: No abnormal bruising or bleeding, blood clots or swollen lymph nodes. · Allergic/Immunologic: No nasal congestion or hives. Physical Examination:    Vitals:    10/27/17 1323   BP: (!) 142/58   Pulse:         Constitutional and General Appearance: NAD, vigorous and appears younger than stated age   Respiratory:  · Normal excursion and expansion without use of accessory muscles  · Resp Auscultation: Normal breath sounds without dullness  Cardiovascular:  · The apical impulses not displaced  · Heart tones are crisp and normal  · Cervical veins are not engorged  · The carotid upstroke is normal in amplitude and contour without delay, + bruits  · Left neck incision  CEA scar  · Regular rate and rhythm. · No adventitious sounds. · Well-healed median sternotomy incision  · Peripheral pulses are symmetrical and full  · There is no clubbing, cyanosis of the extremities.   · No edema  · Femoral Arteries: 2+ and equal  · Pedal Pulses: 2+ and equal   Abdomen:  · No masses or tenderness  · Liver/Spleen: No Abnormalities Noted  Neurological/Psychiatric:  · Alert and oriented in all spheres  · Moves all extremities well  · Exhibits normal gait balance and coordination,   · No abnormalities of mood, affect, memory, mentation, or behavior are noted      Assessment/Plan:    Problem- coronary disease:  Hx of CABG in 1999 and prior stent to LCx.  5/2009: SVG to right PDA patent, SVG to diagonal 1 and ramus patent, SVG to diagonal 1 and ramus patent--LIMA to LAD (small vessel) patent. Last angiogram in 7/2011 showed patent LCx stent, and patent grafts. EF 60%. 9/17LVEDP 15-18  SVG to diag and ramus with 95% mid body stenosis with previous thrombus seen much improved. Stented with 3 x 18 Alpine with excellent angiographic result but slow flow into diag that improved over time. Suspect he gets collateral to diag from LIMA to LAD  [facility] feels better      Problem-- hypertension  BP (!) 142/58   Pulse 62   Ht 5' 6\" (1.676 m)   Wt 150 lb (68 kg)   BMI 24.21 kg/m²      Problem-- hyperlipidemia  A target LDL goal of 70 was reinforced. . Managed by PCP. Problem--AI  4/2013 echo: Normal left ventricle size, wall thickness and Low normal systolic function with an estimated ejection fraction of 55%. No regional wall motion abnormalities are seen. Abnormal septal  motion. Concha Snooks E:A reversal c/w impaired left ventricular relaxation. Mild calcification of the mitral valve. The aortic valve appears sclerotic with the right coronary cusp appearing restricted in movement. Very  mild aortic stenosis. Mild to moderate aortic and mitral regurgitation. Mild tricuspid regurgitation with RVSP estimated at 28 mmHg. 9/17Left ventricle size is normal.   -Normal left ventricular wall thickness.   -Global left ventricular function is decreased with ejection fraction   estimated from 35 % to 40 %.  -There is hypokinesis of the apical anteroseptal wall. E/e'= 16.2 .   -Mitral annular calcification is present.   -Calcification of the mitral valve noted.   -Moderate mitral regurgitation is present.   -The aortic valve is moderately calcified with upper limits gradients.   -Moderate aortic regurgitation is present.   -There is moderate tricuspid regurgitation with RVSP estimated at 56 mmHg.       Problem-- Carotid disease    2/1016: ADALID 76-43% LICA 02-26%  96/6003: ADALID 63-11% LICA 01-47%, unchanged  Left CEA 1/11/16, followed by Dr. Raine Mancera      Problem:HTN- improved    PLAN: Needs BMP BNP  No change in meds    If you have questions, please do not hesitate to call me. I look forward to following Fiona Mills along with you.     Sincerely,        Mike Donohue NP

## 2017-10-27 NOTE — COMMUNICATION BODY
Cardiac Follow Up    Referring Provider:  Bishop Closs, MD        Chief Complaint   Patient presents with    Coronary Artery Disease    Hypertension    Hyperlipidemia       History of Present Illness:  Mr. 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 He had cath  patent LIMA graft to his left anterior descending. His vein grafts had degenerated with his right coronary artery vein graft having serial 50% stenosis and his vein graft to diagonal and obtuse marginal  and sequence having a 95% eccentric stenosis with possible thrombus. He has known ischemic cardiomyopathy with an ejection fraction in range of 40-45%. He also had  echocardiography, which showed moderate mitral regurgitation, very mild aortic stenosis, and mild-to-moderate aortic insufficiency with a  normal left ventricular ejection fraction. He also has mild AI. He had his left CEA on 16. Past Medical History:   has a past medical history of Arthritis; Basal cell carcinoma; CAD (coronary artery disease); Cancer [facility]); CHF (congestive heart failure) ([facility] [address] GERD (gastroesophageal reflux disease); History of blood transfusion; History of MI (myocardial infarction); Hyperlipidemia; and Hypertension. Surgical History:   has a past surgical history that includes Coronary angioplasty with stent (); joint replacement; Cardiac surgery (); Knee Arthroplasty (Left, 2014); and Carotid endarterectomy (Left, 16). Social History:   reports that he quit smoking about 57 years ago. He has never used smokeless tobacco. He reports that he does not drink alcohol or use drugs. He denies tobacco use misuse of prescription medications or illicit drug use. Family History:   Mother--  Father--    Home Medications:     Outpatient Encounter Prescriptions as of 10/27/2017   Medication Sig Dispense Refill    furosemide (LASIX) 20 MG tablet Take 1 tablet by bruising or bleeding, blood clots or swollen lymph nodes. · Allergic/Immunologic: No nasal congestion or hives. Physical Examination:    Vitals:    10/27/17 1323   BP: (!) 142/58   Pulse:         Constitutional and General Appearance: NAD, vigorous and appears younger than stated age   Respiratory:  · Normal excursion and expansion without use of accessory muscles  · Resp Auscultation: Normal breath sounds without dullness  Cardiovascular:  · The apical impulses not displaced  · Heart tones are crisp and normal  · Cervical veins are not engorged  · The carotid upstroke is normal in amplitude and contour without delay, + bruits  · Left neck incision  CEA scar  · Regular rate and rhythm. · No adventitious sounds. · Well-healed median sternotomy incision  · Peripheral pulses are symmetrical and full  · There is no clubbing, cyanosis of the extremities. · No edema  · Femoral Arteries: 2+ and equal  · Pedal Pulses: 2+ and equal   Abdomen:  · No masses or tenderness  · Liver/Spleen: No Abnormalities Noted  Neurological/Psychiatric:  · Alert and oriented in all spheres  · Moves all extremities well  · Exhibits normal gait balance and coordination,   · No abnormalities of mood, affect, memory, mentation, or behavior are noted      Assessment/Plan:    Problem- coronary disease:  Hx of CABG in 1999 and prior stent to LCx.  5/2009: SVG to right PDA patent, SVG to diagonal 1 and ramus patent, SVG to diagonal 1 and ramus patent--LIMA to LAD (small vessel) patent. Last angiogram in 7/2011 showed patent LCx stent, and patent grafts. EF 60%. 9/17LVEDP 15-18  SVG to diag and ramus with 95% mid body stenosis with previous thrombus seen much improved. Stented with 3 x 18 Alpine with excellent angiographic result but slow flow into diag that improved over time.  Suspect he gets collateral to diag from LIMA to LAD  [facility] feels better      Problem-- hypertension  BP (!) 142/58   Pulse 62   Ht 5' 6\" (1.676 m)   Wt 150 lb (68

## 2017-10-27 NOTE — PROGRESS NOTES
kg)   BMI 24.21 kg/m²     Problem-- hyperlipidemia  A target LDL goal of 70 was reinforced. . Managed by PCP. Problem--AI  4/2013 echo: Normal left ventricle size, wall thickness and Low normal systolic function with an estimated ejection fraction of 55%. No regional wall motion abnormalities are seen. Abnormal septal  motion. Blessing Wilson E:A reversal c/w impaired left ventricular relaxation. Mild calcification of the mitral valve. The aortic valve appears sclerotic with the right coronary cusp appearing restricted in movement. Very  mild aortic stenosis. Mild to moderate aortic and mitral regurgitation. Mild tricuspid regurgitation with RVSP estimated at 28 mmHg. 9/17Left ventricle size is normal.   -Normal left ventricular wall thickness.   -Global left ventricular function is decreased with ejection fraction   estimated from 35 % to 40 %.  -There is hypokinesis of the apical anteroseptal wall. E/e'= 16.2 .   -Mitral annular calcification is present.   -Calcification of the mitral valve noted.   -Moderate mitral regurgitation is present.   -The aortic valve is moderately calcified with upper limits gradients.   -Moderate aortic regurgitation is present.   -There is moderate tricuspid regurgitation with RVSP estimated at 56 mmHg.       Problem-- Carotid disease    6/3910: ADALID 78-85% LICA 29-66%  54/2649: ADALID 10-06% LICA 53-45%, unchanged  Left CEA 1/11/16, followed by Dr. Colleen Wallace      Problem:HTN- improved    PLAN:   Needs BMP BNP  No change in meds

## 2017-11-01 ENCOUNTER — TELEPHONE (OUTPATIENT)
Dept: CARDIOLOGY CLINIC | Age: 82
End: 2017-11-01

## 2017-11-01 DIAGNOSIS — R06.00 DYSPNEA, UNSPECIFIED TYPE: Primary | ICD-10-CM

## 2017-12-01 ENCOUNTER — OFFICE VISIT (OUTPATIENT)
Dept: CARDIOLOGY CLINIC | Age: 82
End: 2017-12-01

## 2017-12-01 ENCOUNTER — HOSPITAL ENCOUNTER (OUTPATIENT)
Dept: OTHER | Age: 82
Discharge: HOME | End: 2017-12-01
Attending: NURSE PRACTITIONER | Admitting: NURSE PRACTITIONER

## 2017-12-01 VITALS
BODY MASS INDEX: 23.78 KG/M2 | SYSTOLIC BLOOD PRESSURE: 136 MMHG | DIASTOLIC BLOOD PRESSURE: 80 MMHG | HEIGHT: 66 IN | WEIGHT: 148 LBS | HEART RATE: 68 BPM | OXYGEN SATURATION: 96 %

## 2017-12-01 DIAGNOSIS — R53.83 FATIGUE, UNSPECIFIED TYPE: Primary | ICD-10-CM

## 2017-12-01 DIAGNOSIS — R53.83 FATIGUE, UNSPECIFIED TYPE: ICD-10-CM

## 2017-12-01 DIAGNOSIS — R53.82 CHRONIC FATIGUE: ICD-10-CM

## 2017-12-01 DIAGNOSIS — R06.02 SOB (SHORTNESS OF BREATH): ICD-10-CM

## 2017-12-01 DIAGNOSIS — Z95.1 S/P CABG X 4: Chronic | ICD-10-CM

## 2017-12-01 DIAGNOSIS — I10 ESSENTIAL HYPERTENSION: Chronic | ICD-10-CM

## 2017-12-01 LAB
ANION GAP SERPL CALCULATED.3IONS-SCNC: 16 MMOL/L (ref 3–16)
BUN BLDV-MCNC: 57 MG/DL (ref 7–20)
CALCIUM SERPL-MCNC: 11 MG/DL (ref 8.3–10.6)
CHLORIDE BLD-SCNC: 100 MMOL/L (ref 99–110)
CO2: 32 MMOL/L (ref 21–32)
CREAT SERPL-MCNC: 2.3 MG/DL (ref 0.8–1.3)
GFR AFRICAN AMERICAN: 32
GFR NON-AFRICAN AMERICAN: 27
GLUCOSE BLD-MCNC: 92 MG/DL (ref 70–99)
HCT VFR BLD CALC: 35.3 % (ref 40.5–52.5)
HEMOGLOBIN: 11.6 G/DL (ref 13.5–17.5)
MCH RBC QN AUTO: 27.9 PG (ref 26–34)
MCHC RBC AUTO-ENTMCNC: 33 G/DL (ref 31–36)
MCV RBC AUTO: 84.5 FL (ref 80–100)
PDW BLD-RTO: 16.9 % (ref 12.4–15.4)
PLATELET # BLD: 247 K/UL (ref 135–450)
PMV BLD AUTO: 8.5 FL (ref 5–10.5)
POTASSIUM SERPL-SCNC: 4.6 MMOL/L (ref 3.5–5.1)
PRO-BNP: 2035 PG/ML (ref 0–449)
RBC # BLD: 4.17 M/UL (ref 4.2–5.9)
SODIUM BLD-SCNC: 148 MMOL/L (ref 136–145)
TSH SERPL DL<=0.05 MIU/L-ACNC: 4.26 UIU/ML (ref 0.27–4.2)
WBC # BLD: 6.3 K/UL (ref 4–11)

## 2017-12-01 PROCEDURE — 1123F ACP DISCUSS/DSCN MKR DOCD: CPT | Performed by: NURSE PRACTITIONER

## 2017-12-01 PROCEDURE — 93000 ELECTROCARDIOGRAM COMPLETE: CPT | Performed by: NURSE PRACTITIONER

## 2017-12-01 PROCEDURE — G8598 ASA/ANTIPLAT THER USED: HCPCS | Performed by: NURSE PRACTITIONER

## 2017-12-01 PROCEDURE — G8427 DOCREV CUR MEDS BY ELIG CLIN: HCPCS | Performed by: NURSE PRACTITIONER

## 2017-12-01 PROCEDURE — 4040F PNEUMOC VAC/ADMIN/RCVD: CPT | Performed by: NURSE PRACTITIONER

## 2017-12-01 PROCEDURE — 1036F TOBACCO NON-USER: CPT | Performed by: NURSE PRACTITIONER

## 2017-12-01 PROCEDURE — 99214 OFFICE O/P EST MOD 30 MIN: CPT | Performed by: NURSE PRACTITIONER

## 2017-12-01 PROCEDURE — G8484 FLU IMMUNIZE NO ADMIN: HCPCS | Performed by: NURSE PRACTITIONER

## 2017-12-01 PROCEDURE — G8420 CALC BMI NORM PARAMETERS: HCPCS | Performed by: NURSE PRACTITIONER

## 2017-12-01 RX ORDER — METOPROLOL SUCCINATE 25 MG/1
12.5 TABLET, EXTENDED RELEASE ORAL DAILY
Qty: 90 TABLET | Refills: 3
Start: 2017-12-01 | End: 2018-01-24 | Stop reason: SDUPTHER

## 2017-12-01 NOTE — LETTER
[address] [address] [address] [address]  Phone: [phone]  Fax: [phone]    Gisselle Navarro, ERIN        December 1, 2017     Shelvia Lennox, [address] [place] [address]    Patient: Vonnie Yi  MR Number: [id number]  YOB: 1926  Date of Visit: 12/1/2017    Dear Dr. Shelvia Lennox:            Cardiac Follow Up    Referring Provider:  Shelvia Lennox, MD        Chief Complaint   Patient presents with    Coronary Artery Disease     C/o feeling tired,and sob    Hypertension    Hyperlipidemia       History of Present Illness:  Mr. 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 He had cath 9/27/17  After being evaluated by nephrology due to elevated renal functions. LVEDP 15-18  SVG to diag and ramus with 95% mid body stenosis with previous thrombus seen much improved. Stented with 3 x 18 Alpine with excellent angiographic result but slow flow into diag that improved over time. Suspect he gets collateral to diag from [facility]HT[facility] to LAD   Will continue plavix ,aspirin. Avoid ACE due to renal insufficiency. He has known ischemic cardiomyopathy with an ejection fraction in range of 35-40%. Also has  moderate mitral regurgitation, very mild aortic stenosis, and mild-to-moderate aortic insufficiency with a     He had his left CEA on 1/11/16. He still tries to do everything he did 20 years ago         Past Medical History:   has a past medical history of Arthritis; Basal cell carcinoma; CAD (coronary artery disease); Cancer [facility]); CHF (congestive heart failure) ([facility] [address] GERD (gastroesophageal reflux disease); History of blood transfusion; History of MI (myocardial infarction); Hyperlipidemia; and Hypertension. Surgical History:   has a past surgical history that includes Coronary angioplasty with stent (2007); joint replacement; Cardiac surgery (1999);  Knee Arthroplasty · Gastrointestinal: No abdominal pain, appetite loss, blood in stools. No change in bowel or bladder habits. · Genitourinary: No dysuria, trouble voiding, or hematuria. · Musculoskeletal:  No gait disturbance, weakness or joint complaints. · Integumentary: No rash or pruritis. · Neurological: No headache, diplopia, change in muscle strength, numbness or tingling. No change in gait, balance, coordination, mood, affect, memory, mentation, behavior. · Psychiatric: No anxiety, no depression. · Endocrine: No malaise, fatigue or temperature intolerance. No excessive thirst, fluid intake, or urination. No tremor. · Hematologic/Lymphatic: No abnormal bruising or bleeding, blood clots or swollen lymph nodes. · Allergic/Immunologic: No nasal congestion or hives. Physical Examination:    Vitals:    12/01/17 1144   BP: 136/80   Pulse:    SpO2:         Constitutional and General Appearance: NAD, vigorous and appears younger than stated age   Respiratory:  · Normal excursion and expansion without use of accessory muscles  · Resp Auscultation: Normal breath sounds without dullness  Cardiovascular:  · The apical impulses not displaced  · Heart tones are crisp and normal  · Cervical veins are not engorged  · The carotid upstroke is normal in amplitude and contour without delay, + bruits  · Left neck incision  CEA scar  · Regular rate and rhythm. · No adventitious sounds. · Well-healed median sternotomy incision  · Peripheral pulses are symmetrical and full  · There is no clubbing, cyanosis of the extremities.   · No edema  · Femoral Arteries: 2+ and equal  · Pedal Pulses: 2+ and equal   Abdomen:  · No masses or tenderness  · Liver/Spleen: No Abnormalities Noted  Neurological/Psychiatric:  · Alert and oriented in all spheres  · Moves all extremities well  · Exhibits normal gait balance and coordination,   · No abnormalities of mood, affect, memory, mentation, or behavior are noted      Assessment/Plan:  -There is moderate tricuspid regurgitation with RVSP estimated at 56 mmHg. Problem-- Carotid disease    6/7030: ADALID 59-94% LICA 95-42%  96/2975: ADALID 03-04% LICA 48-61%, unchanged  Left CEA 1/11/16, followed by Dr. Velvet Huerta      Problem:HTN- improved    PLAN: due to fatigue and HR of 65 will try to cut toprol in half to see if helps, d/w him and daughter regarding age, renal disease, cardiac issues he is going to be  Tired at times. Check CBC, TSH, BMP  OV 3 months    If you have questions, please do not hesitate to call me. I look forward to following Edmond Mcnair along with you.     Sincerely,        Faith Goldstein NP

## 2017-12-01 NOTE — COMMUNICATION BODY
Encounter Prescriptions as of 12/1/2017   Medication Sig Dispense Refill    metoprolol succinate (TOPROL XL) 25 MG extended release tablet Take 0.5 tablets by mouth daily 90 tablet 3    furosemide (LASIX) 20 MG tablet Take 1 tablet by mouth daily 30 tablet 3    isosorbide mononitrate (IMDUR) 30 MG extended release tablet Take 1 tablet by mouth daily 30 tablet 3    clopidogrel (PLAVIX) 75 MG tablet Take 1 tablet by mouth daily 30 tablet 3    nitroGLYCERIN (NITROSTAT) 0.4 MG SL tablet Place 1 tablet under the tongue every 5 minutes as needed for Chest pain up to max of 3 total doses. If no relief after 1 dose, call 911. 25 tablet 3    aspirin 81 MG tablet Take 81 mg by mouth daily.  calcium carbonate (CALCIUM 500) 1250 MG tablet Take 1 tablet by mouth daily.  fish oil-omega-3 fatty acids 1000 MG capsule Take 2 g by mouth daily.  [DISCONTINUED] metoprolol succinate (TOPROL XL) 25 MG extended release tablet TAKE 1 TABLET DAILY IN THE MORNING 90 tablet 3     No facility-administered encounter medications on file as of 12/1/2017. Allergies:  Statins and Ambien [zolpidem tartrate]     Review of Systems:   · Constitutional: there has been no unanticipated weight loss. There's been no change in energy level, sleep pattern, or activity level. · Eyes: No visual changes or diplopia. No scleral icterus. · ENT: No Headaches, hearing loss or vertigo. No mouth sores or sore throat. · Cardiovascular: Reviewed in HPI  · Respiratory: No cough or wheezing, no sputum production. No hematemesis. · Gastrointestinal: No abdominal pain, appetite loss, blood in stools. No change in bowel or bladder habits. · Genitourinary: No dysuria, trouble voiding, or hematuria. · Musculoskeletal:  No gait disturbance, weakness or joint complaints. · Integumentary: No rash or pruritis. · Neurological: No headache, diplopia, change in muscle strength, numbness or tingling.  No change in gait, balance, coordination, mood, affect, memory, mentation, behavior. · Psychiatric: No anxiety, no depression. · Endocrine: No malaise, fatigue or temperature intolerance. No excessive thirst, fluid intake, or urination. No tremor. · Hematologic/Lymphatic: No abnormal bruising or bleeding, blood clots or swollen lymph nodes. · Allergic/Immunologic: No nasal congestion or hives. Physical Examination:    Vitals:    12/01/17 1144   BP: 136/80   Pulse:    SpO2:         Constitutional and General Appearance: NAD, vigorous and appears younger than stated age   Respiratory:  · Normal excursion and expansion without use of accessory muscles  · Resp Auscultation: Normal breath sounds without dullness  Cardiovascular:  · The apical impulses not displaced  · Heart tones are crisp and normal  · Cervical veins are not engorged  · The carotid upstroke is normal in amplitude and contour without delay, + bruits  · Left neck incision  CEA scar  · Regular rate and rhythm. · No adventitious sounds. · Well-healed median sternotomy incision  · Peripheral pulses are symmetrical and full  · There is no clubbing, cyanosis of the extremities. · No edema  · Femoral Arteries: 2+ and equal  · Pedal Pulses: 2+ and equal   Abdomen:  · No masses or tenderness  · Liver/Spleen: No Abnormalities Noted  Neurological/Psychiatric:  · Alert and oriented in all spheres  · Moves all extremities well  · Exhibits normal gait balance and coordination,   · No abnormalities of mood, affect, memory, mentation, or behavior are noted      Assessment/Plan:    Problem- coronary disease:  Hx of CABG in 1999 and prior stent to LCx.  5/2009: SVG to right PDA patent, SVG to diagonal 1 and ramus patent, SVG to diagonal 1 and ramus patent--LIMA to LAD (small vessel) patent. Last angiogram in 7/2011 showed patent LCx stent, and patent grafts. EF 60%. 9/17LVEDP 15-18  SVG to diag and ramus with 95% mid body stenosis with previous thrombus seen much improved.  Stented with 3 x 18 Alpine with excellent angiographic result but slow flow into diag that improved over time. Suspect he gets collateral to diag from LIMA to [facility] feels better      Problem-- hypertension  /80 (Site: Left Arm, Position: Sitting, Cuff Size: Medium Adult)   Pulse 68   Ht 5' 6\" (1.676 m)   Wt 148 lb (67.1 kg)   SpO2 96%   BMI 23.89 kg/m²      Problem-- hyperlipidemia  A target LDL goal of 70 was reinforced. . Managed by PCP. Problem--AI  4/2013 echo: Normal left ventricle size, wall thickness and Low normal systolic function with an estimated ejection fraction of 55%. No regional wall motion abnormalities are seen. Abnormal septal  motion. Ama Luisa E:A reversal c/w impaired left ventricular relaxation. Mild calcification of the mitral valve. The aortic valve appears sclerotic with the right coronary cusp appearing restricted in movement. Very  mild aortic stenosis. Mild to moderate aortic and mitral regurgitation. Mild tricuspid regurgitation with RVSP estimated at 28 mmHg. 9/17Left ventricle size is normal.   -Normal left ventricular wall thickness.   -Global left ventricular function is decreased with ejection fraction   estimated from 35 % to 40 %.  -There is hypokinesis of the apical anteroseptal wall. E/e'= 16.2 .   -Mitral annular calcification is present.   -Calcification of the mitral valve noted.   -Moderate mitral regurgitation is present.   -The aortic valve is moderately calcified with upper limits gradients.   -Moderate aortic regurgitation is present.   -There is moderate tricuspid regurgitation with RVSP estimated at 56 mmHg.       Problem-- Carotid disease    9/7933: ADALID 72-80% LICA 21-50%  21/5493: ADALID 74-16% LICA 30-03%, unchanged  Left CEA 1/11/16, followed by Dr. Joesph Leahy      Problem:HTN- improved    PLAN: due to fatigue and HR of 65 will try to cut toprol in half to see if helps, d/w him and daughter regarding age, renal disease, cardiac issues he is going to be  Tired at times.  Check CBC, TSH, BMP  OV 3 months

## 2017-12-01 NOTE — LETTER
· ENT: No Headaches, hearing loss or vertigo. No mouth sores or sore throat. · Cardiovascular: Reviewed in HPI  · Respiratory: No cough or wheezing, no sputum production. No hematemesis. · Gastrointestinal: No abdominal pain, appetite loss, blood in stools. No change in bowel or bladder habits. · Genitourinary: No dysuria, trouble voiding, or hematuria. · Musculoskeletal:  No gait disturbance, weakness or joint complaints. · Integumentary: No rash or pruritis. · Neurological: No headache, diplopia, change in muscle strength, numbness or tingling. No change in gait, balance, coordination, mood, affect, memory, mentation, behavior. · Psychiatric: No anxiety, no depression. · Endocrine: No malaise, fatigue or temperature intolerance. No excessive thirst, fluid intake, or urination. No tremor. · Hematologic/Lymphatic: No abnormal bruising or bleeding, blood clots or swollen lymph nodes. · Allergic/Immunologic: No nasal congestion or hives. Physical Examination:    Vitals:    12/01/17 1144   BP: 136/80   Pulse:    SpO2:         Constitutional and General Appearance: NAD, vigorous and appears younger than stated age   Respiratory:  · Normal excursion and expansion without use of accessory muscles  · Resp Auscultation: Normal breath sounds without dullness  Cardiovascular:  · The apical impulses not displaced  · Heart tones are crisp and normal  · Cervical veins are not engorged  · The carotid upstroke is normal in amplitude and contour without delay, + bruits  · Left neck incision  CEA scar  · Regular rate and rhythm. · No adventitious sounds. · Well-healed median sternotomy incision  · Peripheral pulses are symmetrical and full  · There is no clubbing, cyanosis of the extremities.   · No edema  · Femoral Arteries: 2+ and equal  · Pedal Pulses: 2+ and equal   Abdomen:  · No masses or tenderness  · Liver/Spleen: No Abnormalities Noted  Neurological/Psychiatric:  · Alert and oriented in all spheres · Moves all extremities well  · Exhibits normal gait balance and coordination,   · No abnormalities of mood, affect, memory, mentation, or behavior are noted      Assessment/Plan:    Problem- coronary disease:  Hx of CABG in 1999 and prior stent to LCx.  5/2009: SVG to right PDA patent, SVG to diagonal 1 and ramus patent, SVG to diagonal 1 and ramus patent--LIMA to LAD (small vessel) patent. Last angiogram in 7/2011 showed patent LCx stent, and patent grafts. EF 60%. 9/17LVEDP 15-18  SVG to diag and ramus with 95% mid body stenosis with previous thrombus seen much improved. Stented with 3 x 18 Alpine with excellent angiographic result but slow flow into diag that improved over time. Suspect he gets collateral to diag from LIMA to LAD  [facility] feels better      Problem-- hypertension  /80 (Site: Left Arm, Position: Sitting, Cuff Size: Medium Adult)   Pulse 68   Ht 5' 6\" (1.676 m)   Wt 148 lb (67.1 kg)   SpO2 96%   BMI 23.89 kg/m²      Problem-- hyperlipidemia  A target LDL goal of 70 was reinforced. . Managed by PCP. Problem--AI  4/2013 echo: Normal left ventricle size, wall thickness and Low normal systolic function with an estimated ejection fraction of 55%. No regional wall motion abnormalities are seen. Abnormal septal  motion. [place] E:A reversal c/w impaired left ventricular relaxation. Mild calcification of the mitral valve. The aortic valve appears sclerotic with the right coronary cusp appearing restricted in movement. Very  mild aortic stenosis. Mild to moderate aortic and mitral regurgitation. Mild tricuspid regurgitation with RVSP estimated at 28 mmHg. 9/17Left ventricle size is normal.   -Normal left ventricular wall thickness.   -Global left ventricular function is decreased with ejection fraction   estimated from 35 % to 40 %.  -There is hypokinesis of the apical anteroseptal wall. E/e'= 16.2 .   -Mitral annular calcification is present.   -Calcification of the mitral valve noted.

## 2017-12-01 NOTE — PROGRESS NOTES
Cardiac Follow Up    Referring Provider:  Daniel Sanchez MD        Chief Complaint   Patient presents with    Coronary Artery Disease     C/o feeling tired,and sob    Hypertension    Hyperlipidemia       History of Present Illness:  Mr. Latrice Whiting is a 80year-old, former [employer], who is here for  follow up. He had cath 17  After being evaluated by nephrology due to elevated renal functions. LVEDP 15-18  SVG to diag and ramus with 95% mid body stenosis with previous thrombus seen much improved. Stented with 3 x 18 Alpine with excellent angiographic result but slow flow into diag that improved over time. Suspect he gets collateral to diag from AKHTAR to LAD   Will continue plavix ,aspirin. Avoid ACE due to renal insufficiency. He has known ischemic cardiomyopathy with an ejection fraction in range of 35-40%. Also has  moderate mitral regurgitation, very mild aortic stenosis, and mild-to-moderate aortic insufficiency with a     He had his left CEA on 16. He still tries to do everything he did 20 years ago         Past Medical History:   has a past medical history of Arthritis; Basal cell carcinoma; CAD (coronary artery disease); Cancer [facility]); CHF (congestive heart failure) ([facility] [address] GERD (gastroesophageal reflux disease); History of blood transfusion; History of MI (myocardial infarction); Hyperlipidemia; and Hypertension. Surgical History:   has a past surgical history that includes Coronary angioplasty with stent (); joint replacement; Cardiac surgery (); Knee Arthroplasty (Left, 2014); and Carotid endarterectomy (Left, 16). Social History:   reports that he quit smoking about 57 years ago. He has never used smokeless tobacco. He reports that he does not drink alcohol or use drugs. He denies tobacco use misuse of prescription medications or illicit drug use. Family History:   Mother--  Father--    Home Medications:     Outpatient coordination, mood, affect, memory, mentation, behavior. · Psychiatric: No anxiety, no depression. · Endocrine: No malaise, fatigue or temperature intolerance. No excessive thirst, fluid intake, or urination. No tremor. · Hematologic/Lymphatic: No abnormal bruising or bleeding, blood clots or swollen lymph nodes. · Allergic/Immunologic: No nasal congestion or hives. Physical Examination:    Vitals:    12/01/17 1144   BP: 136/80   Pulse:    SpO2:         Constitutional and General Appearance: NAD, vigorous and appears younger than stated age   Respiratory:  · Normal excursion and expansion without use of accessory muscles  · Resp Auscultation: Normal breath sounds without dullness  Cardiovascular:  · The apical impulses not displaced  · Heart tones are crisp and normal  · Cervical veins are not engorged  · The carotid upstroke is normal in amplitude and contour without delay, + bruits  · Left neck incision  CEA scar  · Regular rate and rhythm. · No adventitious sounds. · Well-healed median sternotomy incision  · Peripheral pulses are symmetrical and full  · There is no clubbing, cyanosis of the extremities. · No edema  · Femoral Arteries: 2+ and equal  · Pedal Pulses: 2+ and equal   Abdomen:  · No masses or tenderness  · Liver/Spleen: No Abnormalities Noted  Neurological/Psychiatric:  · Alert and oriented in all spheres  · Moves all extremities well  · Exhibits normal gait balance and coordination,   · No abnormalities of mood, affect, memory, mentation, or behavior are noted      Assessment/Plan:    Problem- coronary disease:  Hx of CABG in 1999 and prior stent to LCx.  5/2009: SVG to right PDA patent, SVG to diagonal 1 and ramus patent, SVG to diagonal 1 and ramus patent--LIMA to LAD (small vessel) patent. Last angiogram in 7/2011 showed patent LCx stent, and patent grafts. EF 60%. 9/17LVEDP 15-18  SVG to diag and ramus with 95% mid body stenosis with previous thrombus seen much improved.  Stented

## 2017-12-05 ENCOUNTER — TELEPHONE (OUTPATIENT)
Dept: CARDIOLOGY CLINIC | Age: 82
End: 2017-12-05

## 2017-12-05 DIAGNOSIS — R06.02 SOB (SHORTNESS OF BREATH): Primary | ICD-10-CM

## 2017-12-05 NOTE — TELEPHONE ENCOUNTER
Called left message to call back left instructions on voicemail.  ------    Notes Recorded by Asuncion Landers NP on 12/5/2017 at 10:44 AM EST  Tell him to hold his lasix Wednesday and Thursday then cut to half pill a day and check BMP, BNP next week

## 2017-12-07 ENCOUNTER — TELEPHONE (OUTPATIENT)
Dept: CARDIOLOGY CLINIC | Age: 82
End: 2017-12-07

## 2017-12-07 NOTE — TELEPHONE ENCOUNTER
Conversation: Discuss Labs   [employer])   EmmanuelSophia Da Silva          12/5/17 4:11 PM   Note      Called left message to call back left instructions on voicemail.  ------    Notes Recorded by Jayy Denney NP on 12/5/2017 at 10:44 AM EST  Tell him to hold his lasix Wednesday and Thursday then cut to half pill a day and check BMP, BNP next week        Just called back today I informed as per understanding but I told pt's daughter to have him hold lasix Friday and Saturday and check labs next Thursday. I told pt's daughter we would call back if you wanted to give different directions.

## 2017-12-15 ENCOUNTER — HOSPITAL ENCOUNTER (OUTPATIENT)
Dept: OTHER | Age: 82
Discharge: HOME | End: 2017-12-15
Attending: NURSE PRACTITIONER | Admitting: NURSE PRACTITIONER

## 2017-12-15 LAB
ANION GAP SERPL CALCULATED.3IONS-SCNC: 12 MMOL/L (ref 3–16)
BUN BLDV-MCNC: 37 MG/DL (ref 7–20)
CALCIUM SERPL-MCNC: 10.8 MG/DL (ref 8.3–10.6)
CHLORIDE BLD-SCNC: 101 MMOL/L (ref 99–110)
CO2: 29 MMOL/L (ref 21–32)
CREAT SERPL-MCNC: 1.7 MG/DL (ref 0.8–1.3)
GFR AFRICAN AMERICAN: 46
GFR NON-AFRICAN AMERICAN: 38
GLUCOSE BLD-MCNC: 82 MG/DL (ref 70–99)
POTASSIUM SERPL-SCNC: 5 MMOL/L (ref 3.5–5.1)
PRO-BNP: 1701 PG/ML (ref 0–449)
SODIUM BLD-SCNC: 142 MMOL/L (ref 136–145)

## 2017-12-18 ENCOUNTER — TELEPHONE (OUTPATIENT)
Dept: CARDIOLOGY CLINIC | Age: 82
End: 2017-12-18

## 2017-12-18 NOTE — TELEPHONE ENCOUNTER
Result Notes     Notes Recorded by Dacia Cheney MA on 12/18/2017 at 9:47 AM EST  Called patient to call back regarding labs  ------    Notes Recorded by Lyle Acuña NP on 12/16/2017 at 10:37 AM EST  Tell him his labs are better, continue taking lasix as was previously doing     Informed pt as per understanding

## 2018-01-02 ENCOUNTER — OFFICE VISIT (OUTPATIENT)
Dept: INTERNAL MEDICINE CLINIC | Age: 83
End: 2018-01-02

## 2018-01-02 VITALS
HEART RATE: 72 BPM | SYSTOLIC BLOOD PRESSURE: 140 MMHG | DIASTOLIC BLOOD PRESSURE: 60 MMHG | BODY MASS INDEX: 24.21 KG/M2 | WEIGHT: 150 LBS

## 2018-01-02 DIAGNOSIS — F51.01 PRIMARY INSOMNIA: ICD-10-CM

## 2018-01-02 DIAGNOSIS — Z23 NEED FOR INFLUENZA VACCINATION: ICD-10-CM

## 2018-01-02 DIAGNOSIS — I10 ESSENTIAL HYPERTENSION: Primary | ICD-10-CM

## 2018-01-02 DIAGNOSIS — I25.118 CORONARY ARTERY DISEASE OF NATIVE ARTERY OF NATIVE HEART WITH STABLE ANGINA PECTORIS (HCC): ICD-10-CM

## 2018-01-02 PROCEDURE — G0008 ADMIN INFLUENZA VIRUS VAC: HCPCS | Performed by: INTERNAL MEDICINE

## 2018-01-02 PROCEDURE — 90662 IIV NO PRSV INCREASED AG IM: CPT | Performed by: INTERNAL MEDICINE

## 2018-01-02 PROCEDURE — 99213 OFFICE O/P EST LOW 20 MIN: CPT | Performed by: INTERNAL MEDICINE

## 2018-01-02 ASSESSMENT — PATIENT HEALTH QUESTIONNAIRE - PHQ9
2. FEELING DOWN, DEPRESSED OR HOPELESS: 0
SUM OF ALL RESPONSES TO PHQ9 QUESTIONS 1 & 2: 0
1. LITTLE INTEREST OR PLEASURE IN DOING THINGS: 0
SUM OF ALL RESPONSES TO PHQ QUESTIONS 1-9: 0

## 2018-01-12 RX ORDER — ISOSORBIDE MONONITRATE 30 MG/1
TABLET, EXTENDED RELEASE ORAL
Qty: 30 TABLET | Refills: 6 | Status: SHIPPED | OUTPATIENT
Start: 2018-01-12 | End: 2019-01-28 | Stop reason: SDUPTHER

## 2018-01-26 RX ORDER — METOPROLOL SUCCINATE 25 MG/1
12.5 TABLET, EXTENDED RELEASE ORAL DAILY
Qty: 90 TABLET | Refills: 3 | Status: SHIPPED | OUTPATIENT
Start: 2018-01-26 | End: 2019-02-19 | Stop reason: ALTCHOICE

## 2018-01-30 ENCOUNTER — OFFICE VISIT (OUTPATIENT)
Dept: CARDIOLOGY CLINIC | Age: 83
End: 2018-01-30

## 2018-01-30 VITALS
OXYGEN SATURATION: 93 % | DIASTOLIC BLOOD PRESSURE: 60 MMHG | SYSTOLIC BLOOD PRESSURE: 104 MMHG | HEIGHT: 68 IN | WEIGHT: 148 LBS | HEART RATE: 65 BPM | BODY MASS INDEX: 22.43 KG/M2

## 2018-01-30 DIAGNOSIS — I25.118 CORONARY ARTERY DISEASE OF NATIVE ARTERY OF NATIVE HEART WITH STABLE ANGINA PECTORIS (HCC): ICD-10-CM

## 2018-01-30 DIAGNOSIS — Z95.1 S/P CABG X 4: Primary | Chronic | ICD-10-CM

## 2018-01-30 PROCEDURE — G8484 FLU IMMUNIZE NO ADMIN: HCPCS | Performed by: NURSE PRACTITIONER

## 2018-01-30 PROCEDURE — 1123F ACP DISCUSS/DSCN MKR DOCD: CPT | Performed by: NURSE PRACTITIONER

## 2018-01-30 PROCEDURE — 1036F TOBACCO NON-USER: CPT | Performed by: NURSE PRACTITIONER

## 2018-01-30 PROCEDURE — 99214 OFFICE O/P EST MOD 30 MIN: CPT | Performed by: NURSE PRACTITIONER

## 2018-01-30 PROCEDURE — G8427 DOCREV CUR MEDS BY ELIG CLIN: HCPCS | Performed by: NURSE PRACTITIONER

## 2018-01-30 PROCEDURE — G8598 ASA/ANTIPLAT THER USED: HCPCS | Performed by: NURSE PRACTITIONER

## 2018-01-30 PROCEDURE — 4040F PNEUMOC VAC/ADMIN/RCVD: CPT | Performed by: NURSE PRACTITIONER

## 2018-01-30 PROCEDURE — G8420 CALC BMI NORM PARAMETERS: HCPCS | Performed by: NURSE PRACTITIONER

## 2018-01-30 NOTE — LETTER
his toprol, lasix and norvasc were stopped due to HR in the 50s, but the daughter did not stop the lasix or toprol    Past Medical History:   has a past medical history of Arthritis; Basal cell carcinoma; CAD (coronary artery disease); Cancer [facility]); CHF (congestive heart failure) ([facility] [address] GERD (gastroesophageal reflux disease); History of blood transfusion; History of MI (myocardial infarction); Hyperlipidemia; and Hypertension. Surgical History:   has a past surgical history that includes Coronary angioplasty with stent (); joint replacement; Cardiac surgery (); Knee Arthroplasty (Left, 2014); and Carotid endarterectomy (Left, 16). Social History:   reports that he quit smoking about 57 years ago. He has never used smokeless tobacco. He reports that he does not drink alcohol or use drugs. He denies tobacco use misuse of prescription medications or illicit drug use. Family History: Mother--  Father--    Home Medications:     Outpatient Encounter Prescriptions as of 2018   Medication Sig Dispense Refill    metoprolol succinate (TOPROL XL) 25 MG extended release tablet Take 0.5 tablets by mouth daily 90 tablet 3    furosemide (LASIX) 20 MG tablet Take 1 tablet by mouth daily 30 tablet 3    nitroGLYCERIN (NITROSTAT) 0.4 MG SL tablet Place 1 tablet under the tongue every 5 minutes as needed for Chest pain up to max of 3 total doses. If no relief after 1 dose, call 911. 25 tablet 3    aspirin 81 MG tablet Take 81 mg by mouth daily.  calcium carbonate (CALCIUM 500) 1250 MG tablet Take 1 tablet by mouth daily.  fish oil-omega-3 fatty acids 1000 MG capsule Take 2 g by mouth daily.  isosorbide mononitrate (IMDUR) 30 MG extended release tablet TAKE ONE TABLET BY MOUTH DAILY 30 tablet 6    [DISCONTINUED] clopidogrel (PLAVIX) 75 MG tablet Take 1 tablet by mouth daily 30 tablet 3     No facility-administered encounter medications on file as of 2018.  -Normal left ventricular wall thickness.   -Global left ventricular function is decreased with ejection fraction   estimated from 35 % to 40 %.  -There is hypokinesis of the apical anteroseptal wall. E/e'= 16.2 .   -Mitral annular calcification is present.   -Calcification of the mitral valve noted.   -Moderate mitral regurgitation is present.   -The aortic valve is moderately calcified with upper limits gradients.   -Moderate aortic regurgitation is present.   -There is moderate tricuspid regurgitation with RVSP estimated at 56 mmHg. Problem-- Carotid disease    3/6594: ADALID 39-95% LICA 18-53%  08/1627: ADALID 91-27% LICA 99-40%, unchanged  Left CEA 1/11/16, followed by Dr. Durga Raya      Problem:HTN- improved    PLAN:   Overall he is doing better, nneds to stay hydrated  NO change in meds  OV 6 months      > 31 min spent in review of records, medical decision making and counseling    If you have questions, please do not hesitate to call me. I look forward to following [facility] along with you.     Sincerely,        Taisha Orourke NP

## 2018-01-31 NOTE — PROGRESS NOTES
production. No hematemesis. · Gastrointestinal: No abdominal pain, appetite loss, blood in stools. No change in bowel or bladder habits. · Genitourinary: No dysuria, trouble voiding, or hematuria. · Musculoskeletal:  No gait disturbance, weakness or joint complaints. · Integumentary: No rash or pruritis. · Neurological: No headache, diplopia, change in muscle strength, numbness or tingling. No change in gait, balance, coordination, mood, affect, memory, mentation, behavior. · Psychiatric: No anxiety, no depression. · Endocrine: No malaise, fatigue or temperature intolerance. No excessive thirst, fluid intake, or urination. No tremor. · Hematologic/Lymphatic: No abnormal bruising or bleeding, blood clots or swollen lymph nodes. · Allergic/Immunologic: No nasal congestion or hives. Physical Examination:    Vitals:    01/30/18 1441   BP: 104/60   Pulse: 65   SpO2: 93%        Constitutional and General Appearance: NAD, vigorous and appears younger than stated age   Respiratory:  · Normal excursion and expansion without use of accessory muscles  · Resp Auscultation: Normal breath sounds without dullness  Cardiovascular:  · The apical impulses not displaced  · Heart tones are crisp and normal  · Cervical veins are not engorged  · The carotid upstroke is normal in amplitude and contour without delay, + bruits  · Left neck incision  CEA scar  · Regular rate and rhythm. · No adventitious sounds. · Well-healed median sternotomy incision  · Peripheral pulses are symmetrical and full  · There is no clubbing, cyanosis of the extremities.   · No edema  · Femoral Arteries: 2+ and equal  · Pedal Pulses: 2+ and equal   Abdomen:  · No masses or tenderness  · Liver/Spleen: No Abnormalities Noted  Neurological/Psychiatric:  · Alert and oriented in all spheres  · Moves all extremities well  · Exhibits normal gait balance and coordination,   · No abnormalities of mood, affect, memory, mentation, or behavior are

## 2018-02-01 NOTE — COMMUNICATION BODY
present.   -There is moderate tricuspid regurgitation with RVSP estimated at 56 mmHg.       Problem-- Carotid disease    9/6407: ADALID 24-33% LICA 49-38%  12/0577: ADALID 93-96% LICA 83-68%, unchanged  Left CEA 1/11/16, followed by Dr. Ofelia Heck      Problem:HTN- improved    PLAN:   Overall he is doing better, nneds to stay hydrated  NO change in meds  OV 6 months      > 31 min spent in review of records, medical decision making and counseling

## 2018-02-22 ENCOUNTER — TELEPHONE (OUTPATIENT)
Dept: CARDIOLOGY CLINIC | Age: 83
End: 2018-02-22

## 2018-03-06 ENCOUNTER — TELEPHONE (OUTPATIENT)
Dept: INTERNAL MEDICINE CLINIC | Age: 83
End: 2018-03-06

## 2018-03-06 DIAGNOSIS — M51.36 DEGENERATIVE DISC DISEASE, LUMBAR: Primary | ICD-10-CM

## 2018-04-16 RX ORDER — FUROSEMIDE 20 MG/1
TABLET ORAL
Qty: 30 TABLET | Refills: 2 | Status: SHIPPED | OUTPATIENT
Start: 2018-04-16 | End: 2018-07-17 | Stop reason: SDUPTHER

## 2018-05-08 ENCOUNTER — OFFICE VISIT (OUTPATIENT)
Dept: INTERNAL MEDICINE CLINIC | Age: 83
End: 2018-05-08

## 2018-05-08 VITALS — HEART RATE: 72 BPM | DIASTOLIC BLOOD PRESSURE: 60 MMHG | SYSTOLIC BLOOD PRESSURE: 140 MMHG

## 2018-05-08 DIAGNOSIS — E78.49 OTHER HYPERLIPIDEMIA: Chronic | ICD-10-CM

## 2018-05-08 DIAGNOSIS — I10 ESSENTIAL HYPERTENSION: Primary | Chronic | ICD-10-CM

## 2018-05-08 DIAGNOSIS — N18.32 CHRONIC KIDNEY DISEASE (CKD) STAGE G3B/A3, MODERATELY DECREASED GLOMERULAR FILTRATION RATE (GFR) BETWEEN 30-44 ML/MIN/1.73 SQUARE METER AND ALBUMINURIA CREATININE RATIO GREATER THAN 300 MG/G (HCC): ICD-10-CM

## 2018-05-08 DIAGNOSIS — I35.1 NONRHEUMATIC AORTIC VALVE INSUFFICIENCY: ICD-10-CM

## 2018-05-08 DIAGNOSIS — I50.22 CHRONIC SYSTOLIC CONGESTIVE HEART FAILURE (HCC): ICD-10-CM

## 2018-05-08 LAB
BASOPHILS ABSOLUTE: 0.1 K/UL (ref 0–0.2)
BASOPHILS RELATIVE PERCENT: 1.1 %
EOSINOPHILS ABSOLUTE: 0.3 K/UL (ref 0–0.6)
EOSINOPHILS RELATIVE PERCENT: 4.6 %
HCT VFR BLD CALC: 35.1 % (ref 40.5–52.5)
HEMOGLOBIN: 11.4 G/DL (ref 13.5–17.5)
LYMPHOCYTES ABSOLUTE: 1.2 K/UL (ref 1–5.1)
LYMPHOCYTES RELATIVE PERCENT: 16 %
MCH RBC QN AUTO: 27.6 PG (ref 26–34)
MCHC RBC AUTO-ENTMCNC: 32.4 G/DL (ref 31–36)
MCV RBC AUTO: 85.3 FL (ref 80–100)
MONOCYTES ABSOLUTE: 0.7 K/UL (ref 0–1.3)
MONOCYTES RELATIVE PERCENT: 9.9 %
NEUTROPHILS ABSOLUTE: 5.1 K/UL (ref 1.7–7.7)
NEUTROPHILS RELATIVE PERCENT: 68.4 %
PDW BLD-RTO: 18 % (ref 12.4–15.4)
PLATELET # BLD: 407 K/UL (ref 135–450)
PMV BLD AUTO: 8 FL (ref 5–10.5)
RBC # BLD: 4.11 M/UL (ref 4.2–5.9)
WBC # BLD: 7.4 K/UL (ref 4–11)

## 2018-05-08 PROCEDURE — 1123F ACP DISCUSS/DSCN MKR DOCD: CPT | Performed by: INTERNAL MEDICINE

## 2018-05-08 PROCEDURE — G8420 CALC BMI NORM PARAMETERS: HCPCS | Performed by: INTERNAL MEDICINE

## 2018-05-08 PROCEDURE — 1036F TOBACCO NON-USER: CPT | Performed by: INTERNAL MEDICINE

## 2018-05-08 PROCEDURE — 4040F PNEUMOC VAC/ADMIN/RCVD: CPT | Performed by: INTERNAL MEDICINE

## 2018-05-08 PROCEDURE — G8427 DOCREV CUR MEDS BY ELIG CLIN: HCPCS | Performed by: INTERNAL MEDICINE

## 2018-05-08 PROCEDURE — G8598 ASA/ANTIPLAT THER USED: HCPCS | Performed by: INTERNAL MEDICINE

## 2018-05-08 PROCEDURE — 99214 OFFICE O/P EST MOD 30 MIN: CPT | Performed by: INTERNAL MEDICINE

## 2018-05-09 LAB
ALBUMIN SERPL-MCNC: 3.5 G/DL (ref 3.4–5)
ANION GAP SERPL CALCULATED.3IONS-SCNC: 14 MMOL/L (ref 3–16)
BUN BLDV-MCNC: 30 MG/DL (ref 7–20)
CALCIUM SERPL-MCNC: 9.6 MG/DL (ref 8.3–10.6)
CHLORIDE BLD-SCNC: 100 MMOL/L (ref 99–110)
CO2: 28 MMOL/L (ref 21–32)
CREAT SERPL-MCNC: 1.3 MG/DL (ref 0.8–1.3)
GFR AFRICAN AMERICAN: >60
GFR NON-AFRICAN AMERICAN: 52
GLUCOSE BLD-MCNC: 99 MG/DL (ref 70–99)
PHOSPHORUS: 3.3 MG/DL (ref 2.5–4.9)
POTASSIUM SERPL-SCNC: 4.1 MMOL/L (ref 3.5–5.1)
PRO-BNP: 6381 PG/ML (ref 0–449)
SODIUM BLD-SCNC: 142 MMOL/L (ref 136–145)

## 2018-05-31 ENCOUNTER — OFFICE VISIT (OUTPATIENT)
Dept: INTERNAL MEDICINE CLINIC | Age: 83
End: 2018-05-31

## 2018-05-31 VITALS
HEART RATE: 56 BPM | DIASTOLIC BLOOD PRESSURE: 82 MMHG | BODY MASS INDEX: 20.98 KG/M2 | WEIGHT: 138 LBS | SYSTOLIC BLOOD PRESSURE: 170 MMHG

## 2018-05-31 DIAGNOSIS — R42 VERTIGO: Primary | ICD-10-CM

## 2018-05-31 PROCEDURE — G8427 DOCREV CUR MEDS BY ELIG CLIN: HCPCS | Performed by: INTERNAL MEDICINE

## 2018-05-31 PROCEDURE — 1123F ACP DISCUSS/DSCN MKR DOCD: CPT | Performed by: INTERNAL MEDICINE

## 2018-05-31 PROCEDURE — G8598 ASA/ANTIPLAT THER USED: HCPCS | Performed by: INTERNAL MEDICINE

## 2018-05-31 PROCEDURE — 99213 OFFICE O/P EST LOW 20 MIN: CPT | Performed by: INTERNAL MEDICINE

## 2018-05-31 PROCEDURE — G8420 CALC BMI NORM PARAMETERS: HCPCS | Performed by: INTERNAL MEDICINE

## 2018-05-31 PROCEDURE — 1036F TOBACCO NON-USER: CPT | Performed by: INTERNAL MEDICINE

## 2018-05-31 PROCEDURE — 4040F PNEUMOC VAC/ADMIN/RCVD: CPT | Performed by: INTERNAL MEDICINE

## 2018-05-31 RX ORDER — MECLIZINE HYDROCHLORIDE 25 MG/1
25 TABLET ORAL 3 TIMES DAILY PRN
Qty: 30 TABLET | Refills: 0 | Status: SHIPPED | OUTPATIENT
Start: 2018-05-31 | End: 2018-06-10

## 2018-06-01 ENCOUNTER — OFFICE VISIT (OUTPATIENT)
Dept: CARDIOLOGY CLINIC | Age: 83
End: 2018-06-01

## 2018-06-01 VITALS
BODY MASS INDEX: 20.92 KG/M2 | WEIGHT: 138 LBS | SYSTOLIC BLOOD PRESSURE: 140 MMHG | HEART RATE: 62 BPM | DIASTOLIC BLOOD PRESSURE: 60 MMHG | HEIGHT: 68 IN

## 2018-06-01 DIAGNOSIS — I10 ESSENTIAL HYPERTENSION: Primary | Chronic | ICD-10-CM

## 2018-06-01 DIAGNOSIS — Z95.1 S/P CABG X 4: Chronic | ICD-10-CM

## 2018-06-01 DIAGNOSIS — E78.49 OTHER HYPERLIPIDEMIA: Chronic | ICD-10-CM

## 2018-06-01 PROCEDURE — G8420 CALC BMI NORM PARAMETERS: HCPCS | Performed by: NURSE PRACTITIONER

## 2018-06-01 PROCEDURE — 4040F PNEUMOC VAC/ADMIN/RCVD: CPT | Performed by: NURSE PRACTITIONER

## 2018-06-01 PROCEDURE — 1036F TOBACCO NON-USER: CPT | Performed by: NURSE PRACTITIONER

## 2018-06-01 PROCEDURE — 99214 OFFICE O/P EST MOD 30 MIN: CPT | Performed by: NURSE PRACTITIONER

## 2018-06-01 PROCEDURE — G8598 ASA/ANTIPLAT THER USED: HCPCS | Performed by: NURSE PRACTITIONER

## 2018-06-01 PROCEDURE — G8427 DOCREV CUR MEDS BY ELIG CLIN: HCPCS | Performed by: NURSE PRACTITIONER

## 2018-06-01 PROCEDURE — 1123F ACP DISCUSS/DSCN MKR DOCD: CPT | Performed by: NURSE PRACTITIONER

## 2018-06-14 ENCOUNTER — OFFICE VISIT (OUTPATIENT)
Dept: INTERNAL MEDICINE CLINIC | Age: 83
End: 2018-06-14

## 2018-06-14 VITALS
SYSTOLIC BLOOD PRESSURE: 138 MMHG | HEART RATE: 68 BPM | BODY MASS INDEX: 20.37 KG/M2 | WEIGHT: 134 LBS | DIASTOLIC BLOOD PRESSURE: 52 MMHG

## 2018-06-14 DIAGNOSIS — I49.9 IRREGULAR HEART BEAT: ICD-10-CM

## 2018-06-14 DIAGNOSIS — I50.9 CHRONIC CONGESTIVE HEART FAILURE, UNSPECIFIED CONGESTIVE HEART FAILURE TYPE: ICD-10-CM

## 2018-06-14 DIAGNOSIS — R22.32 LOCALIZED SWELLING ON LEFT HAND: ICD-10-CM

## 2018-06-14 DIAGNOSIS — M25.521 RIGHT ELBOW PAIN: ICD-10-CM

## 2018-06-14 DIAGNOSIS — R22.31 LOCALIZED SWELLING ON RIGHT HAND: ICD-10-CM

## 2018-06-14 DIAGNOSIS — R53.83 FATIGUE, UNSPECIFIED TYPE: Primary | ICD-10-CM

## 2018-06-14 LAB
A/G RATIO: 1 (ref 1.1–2.2)
ALBUMIN SERPL-MCNC: 3.3 G/DL (ref 3.4–5)
ALP BLD-CCNC: 95 U/L (ref 40–129)
ALT SERPL-CCNC: 8 U/L (ref 10–40)
ANION GAP SERPL CALCULATED.3IONS-SCNC: 18 MMOL/L (ref 3–16)
AST SERPL-CCNC: 20 U/L (ref 15–37)
BASOPHILS ABSOLUTE: 0.1 K/UL (ref 0–0.2)
BASOPHILS RELATIVE PERCENT: 1.2 %
BILIRUB SERPL-MCNC: 0.4 MG/DL (ref 0–1)
BILIRUBIN URINE: ABNORMAL
BLOOD, URINE: NEGATIVE
BUN BLDV-MCNC: 36 MG/DL (ref 7–20)
CALCIUM SERPL-MCNC: 8.6 MG/DL (ref 8.3–10.6)
CHLORIDE BLD-SCNC: 96 MMOL/L (ref 99–110)
CLARITY: CLEAR
CO2: 30 MMOL/L (ref 21–32)
COLOR: ABNORMAL
CREAT SERPL-MCNC: 1.5 MG/DL (ref 0.8–1.3)
EOSINOPHILS ABSOLUTE: 0.2 K/UL (ref 0–0.6)
EOSINOPHILS RELATIVE PERCENT: 3.6 %
EPITHELIAL CELLS, UA: 0 /HPF (ref 0–5)
GFR AFRICAN AMERICAN: 53
GFR NON-AFRICAN AMERICAN: 44
GLOBULIN: 3.2 G/DL
GLUCOSE BLD-MCNC: 118 MG/DL (ref 70–99)
GLUCOSE URINE: NEGATIVE MG/DL
HCT VFR BLD CALC: 35.1 % (ref 40.5–52.5)
HEMOGLOBIN: 11.7 G/DL (ref 13.5–17.5)
HYALINE CASTS: 1 /LPF (ref 0–8)
KETONES, URINE: NEGATIVE MG/DL
LEUKOCYTE ESTERASE, URINE: NEGATIVE
LYMPHOCYTES ABSOLUTE: 0.8 K/UL (ref 1–5.1)
LYMPHOCYTES RELATIVE PERCENT: 11.1 %
MCH RBC QN AUTO: 27.9 PG (ref 26–34)
MCHC RBC AUTO-ENTMCNC: 33.4 G/DL (ref 31–36)
MCV RBC AUTO: 83.7 FL (ref 80–100)
MICROSCOPIC EXAMINATION: YES
MONOCYTES ABSOLUTE: 0.6 K/UL (ref 0–1.3)
MONOCYTES RELATIVE PERCENT: 8.1 %
NEUTROPHILS ABSOLUTE: 5.2 K/UL (ref 1.7–7.7)
NEUTROPHILS RELATIVE PERCENT: 76 %
NITRITE, URINE: NEGATIVE
PDW BLD-RTO: 17.6 % (ref 12.4–15.4)
PH UA: 5
PLATELET # BLD: 498 K/UL (ref 135–450)
PMV BLD AUTO: 8 FL (ref 5–10.5)
POTASSIUM SERPL-SCNC: 4.2 MMOL/L (ref 3.5–5.1)
PRO-BNP: 4506 PG/ML (ref 0–449)
PROTEIN UA: 100 MG/DL
RBC # BLD: 4.2 M/UL (ref 4.2–5.9)
RBC UA: 4 /HPF (ref 0–4)
SODIUM BLD-SCNC: 144 MMOL/L (ref 136–145)
SPECIFIC GRAVITY UA: 1.02
TOTAL PROTEIN: 6.5 G/DL (ref 6.4–8.2)
URINE REFLEX TO CULTURE: ABNORMAL
URINE TYPE: ABNORMAL
UROBILINOGEN, URINE: 1 E.U./DL
WBC # BLD: 6.9 K/UL (ref 4–11)
WBC UA: 1 /HPF (ref 0–5)

## 2018-06-14 PROCEDURE — 4040F PNEUMOC VAC/ADMIN/RCVD: CPT | Performed by: NURSE PRACTITIONER

## 2018-06-14 PROCEDURE — 1123F ACP DISCUSS/DSCN MKR DOCD: CPT | Performed by: NURSE PRACTITIONER

## 2018-06-14 PROCEDURE — 93000 ELECTROCARDIOGRAM COMPLETE: CPT | Performed by: NURSE PRACTITIONER

## 2018-06-14 PROCEDURE — 99214 OFFICE O/P EST MOD 30 MIN: CPT | Performed by: NURSE PRACTITIONER

## 2018-06-14 PROCEDURE — G8427 DOCREV CUR MEDS BY ELIG CLIN: HCPCS | Performed by: NURSE PRACTITIONER

## 2018-06-14 PROCEDURE — 1036F TOBACCO NON-USER: CPT | Performed by: NURSE PRACTITIONER

## 2018-06-14 PROCEDURE — G8420 CALC BMI NORM PARAMETERS: HCPCS | Performed by: NURSE PRACTITIONER

## 2018-06-14 PROCEDURE — G8598 ASA/ANTIPLAT THER USED: HCPCS | Performed by: NURSE PRACTITIONER

## 2018-06-14 ASSESSMENT — ENCOUNTER SYMPTOMS
SHORTNESS OF BREATH: 0
GASTROINTESTINAL NEGATIVE: 1

## 2018-07-17 RX ORDER — FUROSEMIDE 20 MG/1
TABLET ORAL
Qty: 30 TABLET | Refills: 6 | Status: SHIPPED | OUTPATIENT
Start: 2018-07-17

## 2018-08-07 ENCOUNTER — HOSPITAL ENCOUNTER (OUTPATIENT)
Dept: OTHER | Age: 83
Discharge: HOME | End: 2018-08-07
Attending: FAMILY MEDICINE | Admitting: FAMILY MEDICINE

## 2018-08-07 ENCOUNTER — OFFICE VISIT (OUTPATIENT)
Dept: INTERNAL MEDICINE CLINIC | Age: 83
End: 2018-08-07

## 2018-08-07 ENCOUNTER — TELEPHONE (OUTPATIENT)
Dept: INTERNAL MEDICINE CLINIC | Age: 83
End: 2018-08-07

## 2018-08-07 VITALS
DIASTOLIC BLOOD PRESSURE: 60 MMHG | HEART RATE: 80 BPM | BODY MASS INDEX: 20.86 KG/M2 | SYSTOLIC BLOOD PRESSURE: 130 MMHG | WEIGHT: 137.2 LBS

## 2018-08-07 DIAGNOSIS — R05.9 COUGH: Primary | ICD-10-CM

## 2018-08-07 DIAGNOSIS — R06.02 SHORTNESS OF BREATH: ICD-10-CM

## 2018-08-07 DIAGNOSIS — J18.0 BRONCHOPNEUMONIA, UNSPECIFIED ORGANISM: ICD-10-CM

## 2018-08-07 DIAGNOSIS — I50.22 CHRONIC SYSTOLIC CHF (CONGESTIVE HEART FAILURE) (HCC): ICD-10-CM

## 2018-08-07 DIAGNOSIS — R05.9 COUGH: ICD-10-CM

## 2018-08-07 PROBLEM — I50.32 CHRONIC DIASTOLIC CHF (CONGESTIVE HEART FAILURE) (HCC): Status: ACTIVE | Noted: 2018-08-07

## 2018-08-07 LAB
ANION GAP SERPL CALCULATED.3IONS-SCNC: 16 MMOL/L (ref 3–16)
BASOPHILS ABSOLUTE: 0.1 K/UL (ref 0–0.2)
BASOPHILS RELATIVE PERCENT: 0.8 %
BUN BLDV-MCNC: 26 MG/DL (ref 7–20)
CALCIUM SERPL-MCNC: 9.4 MG/DL (ref 8.3–10.6)
CHLORIDE BLD-SCNC: 99 MMOL/L (ref 99–110)
CO2: 28 MMOL/L (ref 21–32)
CREAT SERPL-MCNC: 1.6 MG/DL (ref 0.8–1.3)
EOSINOPHILS ABSOLUTE: 0.2 K/UL (ref 0–0.6)
EOSINOPHILS RELATIVE PERCENT: 2 %
GFR AFRICAN AMERICAN: 49
GFR NON-AFRICAN AMERICAN: 41
GLUCOSE BLD-MCNC: 144 MG/DL (ref 70–99)
HCT VFR BLD CALC: 32.9 % (ref 40.5–52.5)
HEMOGLOBIN: 10.5 G/DL (ref 13.5–17.5)
LYMPHOCYTES ABSOLUTE: 0.9 K/UL (ref 1–5.1)
LYMPHOCYTES RELATIVE PERCENT: 9.7 %
MCH RBC QN AUTO: 27.4 PG (ref 26–34)
MCHC RBC AUTO-ENTMCNC: 32 G/DL (ref 31–36)
MCV RBC AUTO: 85.8 FL (ref 80–100)
MONOCYTES ABSOLUTE: 0.8 K/UL (ref 0–1.3)
MONOCYTES RELATIVE PERCENT: 8.5 %
NEUTROPHILS ABSOLUTE: 7.7 K/UL (ref 1.7–7.7)
NEUTROPHILS RELATIVE PERCENT: 79 %
PDW BLD-RTO: 18.4 % (ref 12.4–15.4)
PLATELET # BLD: 396 K/UL (ref 135–450)
PMV BLD AUTO: 7.6 FL (ref 5–10.5)
POTASSIUM SERPL-SCNC: 4.2 MMOL/L (ref 3.5–5.1)
PRO-BNP: 5917 PG/ML (ref 0–449)
RBC # BLD: 3.83 M/UL (ref 4.2–5.9)
SODIUM BLD-SCNC: 143 MMOL/L (ref 136–145)
WBC # BLD: 9.8 K/UL (ref 4–11)

## 2018-08-07 PROCEDURE — 1123F ACP DISCUSS/DSCN MKR DOCD: CPT | Performed by: FAMILY MEDICINE

## 2018-08-07 PROCEDURE — 1101F PT FALLS ASSESS-DOCD LE1/YR: CPT | Performed by: FAMILY MEDICINE

## 2018-08-07 PROCEDURE — 99214 OFFICE O/P EST MOD 30 MIN: CPT | Performed by: FAMILY MEDICINE

## 2018-08-07 PROCEDURE — 4040F PNEUMOC VAC/ADMIN/RCVD: CPT | Performed by: FAMILY MEDICINE

## 2018-08-07 PROCEDURE — G8427 DOCREV CUR MEDS BY ELIG CLIN: HCPCS | Performed by: FAMILY MEDICINE

## 2018-08-07 PROCEDURE — 1036F TOBACCO NON-USER: CPT | Performed by: FAMILY MEDICINE

## 2018-08-07 PROCEDURE — G8598 ASA/ANTIPLAT THER USED: HCPCS | Performed by: FAMILY MEDICINE

## 2018-08-07 PROCEDURE — G8420 CALC BMI NORM PARAMETERS: HCPCS | Performed by: FAMILY MEDICINE

## 2018-08-07 RX ORDER — BENZONATATE 100 MG/1
100 CAPSULE ORAL EVERY 6 HOURS PRN
Qty: 30 CAPSULE | Refills: 2 | Status: SHIPPED | OUTPATIENT
Start: 2018-08-07 | End: 2018-09-06

## 2018-08-07 RX ORDER — LEVOFLOXACIN 750 MG/1
750 TABLET ORAL DAILY
Qty: 5 TABLET | Refills: 0 | Status: SHIPPED | OUTPATIENT
Start: 2018-08-07 | End: 2018-08-12

## 2018-08-07 ASSESSMENT — ENCOUNTER SYMPTOMS
TROUBLE SWALLOWING: 0
FACIAL SWELLING: 0
STRIDOR: 0
SHORTNESS OF BREATH: 0
CHEST TIGHTNESS: 0
EYE ITCHING: 0
EYE DISCHARGE: 0

## 2018-08-07 NOTE — PROGRESS NOTES
Subjective:      Patient ID: Shaheen Woodson is a 80 y.o. male. HPI   Chief Complaint   Patient presents with    Cough     x 4 day     Chest Congestion    Drainage     Here with daughter Atiya Welch. Some cough for a week or 2. She says he has this \"cold\" for a year. Past 4 days it got worse and last night really bad. Developed pain in the left chest and back with the cough and did not sleep  Yellow sputum  No cough medication used. Lower mid chest and left ribs and whole back hurts to cough. No fever. Gets warm easily    Past medical history, social history, family history reviewed or updated. He has CHF and follows with cardiology. Has remote h/o prostate cancer treated with hormonal therapy. .    Review of Systems   Constitutional: Positive for fatigue. Negative for chills, diaphoresis and fever. HENT: Negative for drooling, ear pain, facial swelling, nosebleeds and trouble swallowing. Eyes: Negative for discharge and itching. Respiratory: Negative for chest tightness, shortness of breath and stridor. Cardiovascular: Negative for chest pain. Musculoskeletal: Negative for neck pain and neck stiffness. Objective:   Physical Exam   Constitutional: He is oriented to person, place, and time. He appears well-developed and well-nourished. No distress. Moist frequent cough   HENT:   Head: Normocephalic and atraumatic. Right Ear: Hearing, tympanic membrane, external ear and ear canal normal.   Left Ear: External ear normal.   Nose: No sinus tenderness. Right sinus exhibits no maxillary sinus tenderness and no frontal sinus tenderness. Left sinus exhibits no maxillary sinus tenderness and no frontal sinus tenderness. Mouth/Throat: Uvula is midline, oropharynx is clear and moist and mucous membranes are normal. No oropharyngeal exudate, posterior oropharyngeal edema or posterior oropharyngeal erythema.    Cerumen in 80% of EAC on the left   Eyes: Conjunctivae are normal. Right eye

## 2018-08-20 ENCOUNTER — OFFICE VISIT (OUTPATIENT)
Dept: INTERNAL MEDICINE CLINIC | Age: 83
End: 2018-08-20

## 2018-08-20 ENCOUNTER — TELEPHONE (OUTPATIENT)
Dept: CARDIOLOGY CLINIC | Age: 83
End: 2018-08-20

## 2018-08-20 VITALS
HEART RATE: 68 BPM | DIASTOLIC BLOOD PRESSURE: 58 MMHG | OXYGEN SATURATION: 91 % | SYSTOLIC BLOOD PRESSURE: 140 MMHG | BODY MASS INDEX: 21.13 KG/M2 | TEMPERATURE: 97.5 F | WEIGHT: 139 LBS

## 2018-08-20 DIAGNOSIS — I50.22 CHRONIC SYSTOLIC CHF (CONGESTIVE HEART FAILURE) (HCC): Primary | ICD-10-CM

## 2018-08-20 DIAGNOSIS — J18.9 PNEUMONIA OF BOTH LOWER LOBES DUE TO INFECTIOUS ORGANISM: ICD-10-CM

## 2018-08-20 PROCEDURE — 99214 OFFICE O/P EST MOD 30 MIN: CPT | Performed by: INTERNAL MEDICINE

## 2018-08-20 PROCEDURE — G8427 DOCREV CUR MEDS BY ELIG CLIN: HCPCS | Performed by: INTERNAL MEDICINE

## 2018-08-20 PROCEDURE — 1101F PT FALLS ASSESS-DOCD LE1/YR: CPT | Performed by: INTERNAL MEDICINE

## 2018-08-20 PROCEDURE — G8598 ASA/ANTIPLAT THER USED: HCPCS | Performed by: INTERNAL MEDICINE

## 2018-08-20 PROCEDURE — 1036F TOBACCO NON-USER: CPT | Performed by: INTERNAL MEDICINE

## 2018-08-20 PROCEDURE — G8420 CALC BMI NORM PARAMETERS: HCPCS | Performed by: INTERNAL MEDICINE

## 2018-08-20 PROCEDURE — 1123F ACP DISCUSS/DSCN MKR DOCD: CPT | Performed by: INTERNAL MEDICINE

## 2018-08-20 PROCEDURE — 4040F PNEUMOC VAC/ADMIN/RCVD: CPT | Performed by: INTERNAL MEDICINE

## 2018-08-20 RX ORDER — HYDRALAZINE HYDROCHLORIDE 25 MG/1
25 TABLET, FILM COATED ORAL 2 TIMES DAILY
Qty: 180 TABLET | Refills: 3 | Status: ON HOLD | OUTPATIENT
Start: 2018-08-20 | End: 2018-08-30 | Stop reason: HOSPADM

## 2018-08-20 NOTE — TELEPHONE ENCOUNTER
He has been sick for 2 weeks and saw his pcp today b/p 140/58  and was told to see LES . The last 2 weeks he has felt bad, weak, productive cough,back/chest pain  and nausea. He had CXR at [facility] 2 weeks ago . Daughter states he has mentioned an irregular HR and just very weak. Can he be worked in this week? He usually sees [facility] but she is oot x 2 weeks and no other NP has open appt.

## 2018-08-21 PROBLEM — R53.1 GENERALIZED WEAKNESS: Status: ACTIVE | Noted: 2018-08-21

## 2018-08-21 PROBLEM — J15.9 COMMUNITY ACQUIRED BACTERIAL PNEUMONIA: Status: ACTIVE | Noted: 2018-08-21

## 2018-08-23 ASSESSMENT — ENCOUNTER SYMPTOMS
EYE DISCHARGE: 0
EYE ITCHING: 0
CHEST TIGHTNESS: 0
SHORTNESS OF BREATH: 1
FACIAL SWELLING: 0
STRIDOR: 0
TROUBLE SWALLOWING: 0

## 2018-08-23 NOTE — PROGRESS NOTES
clear and moist and mucous membranes are normal. No oropharyngeal exudate, posterior oropharyngeal edema or posterior oropharyngeal erythema. Cerumen in 80% of EAC on the left   Eyes: Conjunctivae are normal. Right eye exhibits no discharge. Left eye exhibits no discharge. Neck: Normal range of motion. Neck supple. No JVD present. No thyroid mass and no thyromegaly present. Cardiovascular: Normal rate, regular rhythm and normal pulses. Exam reveals distant heart sounds. Exam reveals no gallop. No murmur heard. Pulmonary/Chest: Effort normal. No respiratory distress. He has no wheezes. He has rhonchi. He has rales in the left lower field. Lymphadenopathy:     He has no cervical adenopathy. Right: No supraclavicular adenopathy present. Left: No supraclavicular adenopathy present. Neurological: He is alert and oriented to person, place, and time. Skin: Skin is warm and dry. No rash noted. He is not diaphoretic. No cyanosis. Nails show no clubbing. Psychiatric: He has a normal mood and affect. Vitals reviewed. Reviewed cardiology notes. Prior cath with reduced EF felt to be ischemic. Last echo with very minimally low EF    Assessment:      1. Cough  Ongoing problem, he has had treatment for pneumonia. May be secondary to heart failure. 2 Bronchopneumonia, unspecified organism  Has completed a full course of antibiotics       3 Chronic systolic vs diastolic CHF (congestive heart failure) ([facility] [address]  He cannot take ACE inhibitor for [employer]           Plan:      He has finished a full course of treatment for pneumonia    I suspect his symptoms are primarily those of congestive heart failure.     Since he cannot take a ARBs or ACE inhibitor I have placed him on hydralazine in addition to his nitrates in order to try to improve his symptoms which I believe are more those of heart failure rather than persistent pneumonia

## 2018-08-31 ENCOUNTER — TELEPHONE (OUTPATIENT)
Dept: INTERNAL MEDICINE CLINIC | Age: 83
End: 2018-08-31

## 2018-10-16 ENCOUNTER — TELEPHONE (OUTPATIENT)
Dept: INTERNAL MEDICINE CLINIC | Age: 83
End: 2018-10-16

## 2018-10-23 ENCOUNTER — TELEPHONE (OUTPATIENT)
Dept: INTERNAL MEDICINE CLINIC | Age: 83
End: 2018-10-23

## 2018-10-23 NOTE — TELEPHONE ENCOUNTER
Phone call from Khadar Zabala, nurse at [facility]. Patient is complaining of Tinnitus and Vertigo. Says the tinnitus is bothering him more. Patient's wife said Klever Javed gave him a pill a couple of months ago that really helped this. She threw the bottle away and doesn't remember what is was. He was prescribed Meclizine in May for vertigo. Please call Khadar Zabala to advise.

## 2018-11-06 ENCOUNTER — TELEPHONE (OUTPATIENT)
Dept: INTERNAL MEDICINE CLINIC | Age: 83
End: 2018-11-06

## 2018-11-13 ENCOUNTER — TELEPHONE (OUTPATIENT)
Dept: INTERNAL MEDICINE CLINIC | Age: 83
End: 2018-11-13

## 2018-11-20 ENCOUNTER — TELEPHONE (OUTPATIENT)
Dept: INTERNAL MEDICINE CLINIC | Age: 83
End: 2018-11-20

## 2018-12-04 ENCOUNTER — TELEPHONE (OUTPATIENT)
Dept: INTERNAL MEDICINE CLINIC | Age: 83
End: 2018-12-04

## 2019-01-03 ENCOUNTER — TELEPHONE (OUTPATIENT)
Dept: INTERNAL MEDICINE CLINIC | Age: 84
End: 2019-01-03

## 2019-01-28 RX ORDER — ISOSORBIDE MONONITRATE 30 MG/1
TABLET, EXTENDED RELEASE ORAL
Qty: 30 TABLET | Refills: 5 | Status: SHIPPED | OUTPATIENT
Start: 2019-01-28

## 2019-02-19 ENCOUNTER — TELEPHONE (OUTPATIENT)
Dept: INTERNAL MEDICINE CLINIC | Age: 84
End: 2019-02-19

## 2019-02-20 ENCOUNTER — TELEPHONE (OUTPATIENT)
Dept: INTERNAL MEDICINE CLINIC | Age: 84
End: 2019-02-20